# Patient Record
Sex: FEMALE | Race: ASIAN | NOT HISPANIC OR LATINO | URBAN - METROPOLITAN AREA
[De-identification: names, ages, dates, MRNs, and addresses within clinical notes are randomized per-mention and may not be internally consistent; named-entity substitution may affect disease eponyms.]

---

## 2019-11-18 ENCOUNTER — OUTPATIENT (OUTPATIENT)
Dept: OUTPATIENT SERVICES | Facility: HOSPITAL | Age: 39
LOS: 1 days | End: 2019-11-18
Payer: COMMERCIAL

## 2019-11-18 ENCOUNTER — APPOINTMENT (OUTPATIENT)
Dept: RADIOLOGY | Facility: CLINIC | Age: 39
End: 2019-11-18
Payer: COMMERCIAL

## 2019-11-18 DIAGNOSIS — Z00.8 ENCOUNTER FOR OTHER GENERAL EXAMINATION: ICD-10-CM

## 2019-11-18 PROCEDURE — 71046 X-RAY EXAM CHEST 2 VIEWS: CPT

## 2019-11-18 PROCEDURE — 71046 X-RAY EXAM CHEST 2 VIEWS: CPT | Mod: 26

## 2020-04-25 ENCOUNTER — MESSAGE (OUTPATIENT)
Age: 40
End: 2020-04-25

## 2020-05-05 LAB
SARS-COV-2 IGG SERPL IA-ACNC: 7.4 INDEX
SARS-COV-2 IGG SERPL QL IA: POSITIVE

## 2020-12-15 ENCOUNTER — RESULT REVIEW (OUTPATIENT)
Age: 40
End: 2020-12-15

## 2021-05-19 ENCOUNTER — APPOINTMENT (OUTPATIENT)
Dept: OBGYN | Facility: CLINIC | Age: 41
End: 2021-05-19
Payer: COMMERCIAL

## 2021-05-19 ENCOUNTER — NON-APPOINTMENT (OUTPATIENT)
Age: 41
End: 2021-05-19

## 2021-05-19 VITALS
SYSTOLIC BLOOD PRESSURE: 110 MMHG | BODY MASS INDEX: 25.72 KG/M2 | DIASTOLIC BLOOD PRESSURE: 70 MMHG | HEIGHT: 60 IN | WEIGHT: 131 LBS

## 2021-05-19 DIAGNOSIS — Z87.42 PERSONAL HISTORY OF OTHER DISEASES OF THE FEMALE GENITAL TRACT: ICD-10-CM

## 2021-05-19 DIAGNOSIS — Z97.5 PRESENCE OF (INTRAUTERINE) CONTRACEPTIVE DEVICE: ICD-10-CM

## 2021-05-19 DIAGNOSIS — Z11.3 ENCOUNTER FOR SCREENING FOR INFECTIONS WITH A PREDOMINANTLY SEXUAL MODE OF TRANSMISSION: ICD-10-CM

## 2021-05-19 DIAGNOSIS — N89.8 OTHER SPECIFIED NONINFLAMMATORY DISORDERS OF VAGINA: ICD-10-CM

## 2021-05-19 PROCEDURE — 82270 OCCULT BLOOD FECES: CPT

## 2021-05-19 PROCEDURE — 99386 PREV VISIT NEW AGE 40-64: CPT

## 2021-05-19 PROCEDURE — 99072 ADDL SUPL MATRL&STAF TM PHE: CPT

## 2021-05-19 NOTE — HISTORY OF PRESENT ILLNESS
[FreeTextEntry1] : Patient is a 40-year-old female presents for routine annual gynecologic examination. Patient has complaints of occasional intermittent vaginal itching. Patient denies any abnormal vaginal discharge or odor. Patient desires STD screening and would like IUD contraception. Discussed IUDs patient would like a ParaGard 10 year IUD. Risks benefits and alternatives discussed the patient and instructions given. Patient has had a mammogram several years ago.

## 2021-05-19 NOTE — PHYSICAL EXAM
[Appropriately responsive] : appropriately responsive [Alert] : alert [No Acute Distress] : no acute distress [No Lymphadenopathy] : no lymphadenopathy [Regular Rate Rhythm] : regular rate rhythm [No Murmurs] : no murmurs [Clear to Auscultation B/L] : clear to auscultation bilaterally [Soft] : soft [Non-tender] : non-tender [Non-distended] : non-distended [No HSM] : No HSM [No Lesions] : no lesions [No Mass] : no mass [Oriented x3] : oriented x3 [FreeTextEntry6] : bilateral breast implants, soft nontender without any masses, no skin changes or nipple discharge, axilla negative [Examination Of The Breasts] : a normal appearance [No Masses] : no breast masses were palpable [Labia Minora] : normal [Labia Majora] : normal [Normal] : normal [Uterine Adnexae] : normal [No Tenderness] : no tenderness [Nl Sphincter Tone] : normal sphincter tone [FreeTextEntry4] : culture done [FreeTextEntry5] : pap done [FreeTextEntry9] : Hemoccult-negative

## 2021-05-20 ENCOUNTER — NON-APPOINTMENT (OUTPATIENT)
Age: 41
End: 2021-05-20

## 2021-05-20 LAB
CANDIDA VAG CYTO: NOT DETECTED
G VAGINALIS+PREV SP MTYP VAG QL MICRO: NOT DETECTED
T VAGINALIS VAG QL WET PREP: NOT DETECTED

## 2021-05-21 LAB
C TRACH RRNA SPEC QL NAA+PROBE: NOT DETECTED
HPV HIGH+LOW RISK DNA PNL CVX: NOT DETECTED
N GONORRHOEA RRNA SPEC QL NAA+PROBE: NOT DETECTED
SOURCE TP AMPLIFICATION: NORMAL

## 2021-06-14 ENCOUNTER — APPOINTMENT (OUTPATIENT)
Dept: OBGYN | Facility: CLINIC | Age: 41
End: 2021-06-14

## 2021-08-27 ENCOUNTER — APPOINTMENT (OUTPATIENT)
Dept: DISASTER EMERGENCY | Facility: OTHER | Age: 41
End: 2021-08-27

## 2021-10-02 ENCOUNTER — APPOINTMENT (OUTPATIENT)
Dept: DISASTER EMERGENCY | Facility: OTHER | Age: 41
End: 2021-10-02

## 2021-12-28 ENCOUNTER — APPOINTMENT (OUTPATIENT)
Dept: PSYCHIATRY | Facility: CLINIC | Age: 41
End: 2021-12-28

## 2022-04-08 ENCOUNTER — TRANSCRIPTION ENCOUNTER (OUTPATIENT)
Age: 42
End: 2022-04-08

## 2022-04-26 ENCOUNTER — NON-APPOINTMENT (OUTPATIENT)
Age: 42
End: 2022-04-26

## 2022-04-26 ENCOUNTER — APPOINTMENT (OUTPATIENT)
Dept: INTERNAL MEDICINE | Facility: CLINIC | Age: 42
End: 2022-04-26
Payer: COMMERCIAL

## 2022-04-26 VITALS
SYSTOLIC BLOOD PRESSURE: 156 MMHG | HEIGHT: 60 IN | HEART RATE: 95 BPM | BODY MASS INDEX: 25.23 KG/M2 | TEMPERATURE: 98 F | RESPIRATION RATE: 12 BRPM | OXYGEN SATURATION: 97 % | DIASTOLIC BLOOD PRESSURE: 98 MMHG | WEIGHT: 128.5 LBS

## 2022-04-26 PROCEDURE — 99204 OFFICE O/P NEW MOD 45 MIN: CPT

## 2022-04-26 NOTE — REVIEW OF SYSTEMS
[Fever] : no fever [Chills] : no chills [Fatigue] : no fatigue [Night Sweats] : no night sweats [Chest Pain] : no chest pain [Palpitations] : no palpitations [Shortness Of Breath] : no shortness of breath [Wheezing] : no wheezing [Cough] : no cough [Abdominal Pain] : no abdominal pain [Nausea] : no nausea [Constipation] : no constipation [Diarrhea] : diarrhea [Vomiting] : no vomiting [Dysuria] : no dysuria [Hematuria] : no hematuria [Joint Pain] : no joint pain [Back Pain] : back pain [Headache] : no headache [Dizziness] : no dizziness

## 2022-04-26 NOTE — PHYSICAL EXAM
[No Acute Distress] : no acute distress [Well Nourished] : well nourished [Well Developed] : well developed [Well-Appearing] : well-appearing [Normal Sclera/Conjunctiva] : normal sclera/conjunctiva [No Respiratory Distress] : no respiratory distress  [No Accessory Muscle Use] : no accessory muscle use [Clear to Auscultation] : lungs were clear to auscultation bilaterally [Normal Rate] : normal rate  [Regular Rhythm] : with a regular rhythm [Normal S1, S2] : normal S1 and S2 [No Murmur] : no murmur heard [No Edema] : there was no peripheral edema [Soft] : abdomen soft [Non Tender] : non-tender [Non-distended] : non-distended [Normal Bowel Sounds] : normal bowel sounds [No CVA Tenderness] : no CVA  tenderness [No Spinal Tenderness] : no spinal tenderness [de-identified] : Positive straight leg test on the left [de-identified] : 2+ patellar reflexes bilaterally.  Normal strength bilaterally.  Sensation intact bilaterally

## 2022-04-26 NOTE — ASSESSMENT
[FreeTextEntry1] : 1.  Acute sciatica\par Stop cyclobenzaprine\par Start gabapentin 100 mg nightly and can increase to 100 mg twice daily\par Lumbar spine x-rays\par Ortho fast-track for expedited orthopedic evaluation

## 2022-04-26 NOTE — HISTORY OF PRESENT ILLNESS
[FreeTextEntry8] : Petr presents as a new patient.  She is concerned about sciatica and back pain.  She has been out of work since April 6.  Pain started around that time.  She thinks it may have possibly been triggered from a workout.  Denies any acute episodes where she suddenly felt sharp back pain however.  Reports that in the past, she has had mild low back pain that improved with exercising, stretching and going to the chiropractor.  However, she has never had back pain like this.  Pain started in the lower and left-sided lower back.  The pain radiated from the left back down the left [place] towards the left toe.  She did have numbness and tingling and some weakness on the left foot.  She went to urgent care.  Urgent care diagnosed her with sciatica and gave her a Medrol Dosepak and cyclobenzaprine.  She had some mild improvement with the Medrol Dosepak.  The cyclobenzaprine did not help her symptoms.  She has also been taking 2 Aleve a day.  She recently saw an acupuncturist and chiropractor.  She has had some minimal improvement since that time.  She has not yet seen an orthopedist.  No fevers or chills.  No unexplained weight loss.  No bowel or bladder incontinence.  No saddle anesthesia.

## 2022-04-27 ENCOUNTER — OUTPATIENT (OUTPATIENT)
Dept: OUTPATIENT SERVICES | Facility: HOSPITAL | Age: 42
LOS: 1 days | End: 2022-04-27
Payer: COMMERCIAL

## 2022-04-27 ENCOUNTER — RESULT REVIEW (OUTPATIENT)
Age: 42
End: 2022-04-27

## 2022-04-27 ENCOUNTER — APPOINTMENT (OUTPATIENT)
Dept: RADIOLOGY | Facility: CLINIC | Age: 42
End: 2022-04-27
Payer: COMMERCIAL

## 2022-04-27 DIAGNOSIS — M54.30 SCIATICA, UNSPECIFIED SIDE: ICD-10-CM

## 2022-04-27 PROCEDURE — 72100 X-RAY EXAM L-S SPINE 2/3 VWS: CPT | Mod: 26

## 2022-04-27 PROCEDURE — 72100 X-RAY EXAM L-S SPINE 2/3 VWS: CPT

## 2022-04-29 ENCOUNTER — APPOINTMENT (OUTPATIENT)
Dept: ORTHOPEDIC SURGERY | Facility: CLINIC | Age: 42
End: 2022-04-29
Payer: COMMERCIAL

## 2022-04-29 VITALS — WEIGHT: 128 LBS | HEIGHT: 60 IN | BODY MASS INDEX: 25.13 KG/M2

## 2022-04-29 PROCEDURE — 99204 OFFICE O/P NEW MOD 45 MIN: CPT

## 2022-04-29 NOTE — HISTORY OF PRESENT ILLNESS
[de-identified] : 42 yo female with 3 weeks of low back pain with right sided radiculopathy down the buttock, posterior thigh and lateral thigh. She went to the urgent care initially, started on prednisone, Gabapentin and an NSAID. She has been seeing a chiropractor and doing acupuncture. She states the pain has improved since it began 3 weeks ago. She denies any previous history of back pain. Denies any bowel or bladder dysfunction or saddle anesthesia.

## 2022-04-29 NOTE — PHYSICAL EXAM
[de-identified] : Lumbar Physical Exam\par \par Gait - Normal\par \par Station - Normal\par \par Sagittal balance - Normal\par \par Compensatory mechanism? - None\par \par Difficulty with heel and toe walk\par \par Reflexes\par Patellar - normal\par Gastroc - normal\par Clonus - No\par \par Hip Exam - Normal\par \par Straight leg raise - none\par \par Pulses - 2+ dp/pt\par \par Range of motion - normal\par \par Sensation \par Sensation intact to light touch in L1, L2, L3, L4, L5 and S1 dermatomes bilaterally\par \par Motor\par 	IP	Quad	HS	TA	Gastroc	EHL\par Right	5/5	5/5	5/5	5/5	5/5	5/5\par Left	5/5	5/5	5/5	5/5	4/5	5/5 [de-identified] : X-rays reviewed\par Disc degeneration noted at L5-S1

## 2022-04-29 NOTE — ASSESSMENT
[FreeTextEntry1] : I had a lengthy discussion with the patient in regards to their treatment plan and diagnosis.  They do have objective weakness findings on my exam.  Their symptoms have persisted despite the conservative management they have attempted thus far.  As a result I would like to proceed with a lumbar MRI.  In tandem with this they should begin physical therapy/home therapy program.  The patient can take Tylenol/NSAIDs as needed for pain control if medically able to.  I will have the patient follow-up in 3 to 4 weeks for repeat clinical evaluation.  I encouraged them to follow-up sooner if their symptoms worsen or change in any way.\par \par The patient has tried the following treatments:\par Activity modification          +\par Ice/Compression                  +\par Braces                                     -\par NSAIDS                                   +\par Physical Therapy                   +\par \par Please note the above modalities been tried for over 6+ weeks of the past 2 months

## 2022-05-03 ENCOUNTER — OUTPATIENT (OUTPATIENT)
Dept: OUTPATIENT SERVICES | Facility: HOSPITAL | Age: 42
LOS: 1 days | End: 2022-05-03
Payer: COMMERCIAL

## 2022-05-03 ENCOUNTER — APPOINTMENT (OUTPATIENT)
Dept: MRI IMAGING | Facility: CLINIC | Age: 42
End: 2022-05-03
Payer: COMMERCIAL

## 2022-05-03 DIAGNOSIS — M54.9 DORSALGIA, UNSPECIFIED: ICD-10-CM

## 2022-05-03 PROCEDURE — 72148 MRI LUMBAR SPINE W/O DYE: CPT

## 2022-05-03 PROCEDURE — 72148 MRI LUMBAR SPINE W/O DYE: CPT | Mod: 26

## 2022-05-17 ENCOUNTER — APPOINTMENT (OUTPATIENT)
Dept: PSYCHIATRY | Facility: CLINIC | Age: 42
End: 2022-05-17
Payer: COMMERCIAL

## 2022-05-17 VITALS
OXYGEN SATURATION: 99 % | BODY MASS INDEX: 25.13 KG/M2 | HEART RATE: 83 BPM | WEIGHT: 128 LBS | RESPIRATION RATE: 13 BRPM | HEIGHT: 60 IN

## 2022-05-17 PROCEDURE — 90791 PSYCH DIAGNOSTIC EVALUATION: CPT

## 2022-05-19 ENCOUNTER — APPOINTMENT (OUTPATIENT)
Dept: ORTHOPEDIC SURGERY | Facility: CLINIC | Age: 42
End: 2022-05-19
Payer: COMMERCIAL

## 2022-05-19 VITALS
HEIGHT: 60 IN | BODY MASS INDEX: 25.13 KG/M2 | SYSTOLIC BLOOD PRESSURE: 115 MMHG | WEIGHT: 128 LBS | DIASTOLIC BLOOD PRESSURE: 76 MMHG | RESPIRATION RATE: 78 BRPM | OXYGEN SATURATION: 96 %

## 2022-05-19 PROCEDURE — 99214 OFFICE O/P EST MOD 30 MIN: CPT

## 2022-05-19 NOTE — HISTORY OF PRESENT ILLNESS
[de-identified] : Today the patient states that she is still dealing with some significant left-sided leg pain.  It does go down her posterior thigh posterior calf.  She does state that the symptoms are disabling at times.  Fortunately since her last visit she has had some improvement in her pain but the discomfort is still present.  She denies any bowel bladder issues.  She denies any saddle anesthesia.

## 2022-05-19 NOTE — PHYSICAL EXAM
[de-identified] : Lumbar Physical Exam\par \par Gait - Normal\par \par Station - Normal\par \par Sagittal balance - Normal\par \par Compensatory mechanism? - None\par \par Difficulty with heel and toe walk\par \par Reflexes\par Patellar - normal\par Gastroc - normal\par Clonus - No\par \par Hip Exam - Normal\par \par Straight leg raise - none\par \par Pulses - 2+ dp/pt\par \par Range of motion - normal\par \par Sensation \par Sensation intact to light touch in L1, L2, L3, L4, L5 and S1 dermatomes bilaterally\par \par Motor\par 	IP	Quad	HS	TA	Gastroc	EHL\par Right	5/5	5/5	5/5	5/5	5/5	5/5\par Left	5/5	5/5	5/5	5/5	4/5	5/5 [de-identified] : X-rays reviewed\par Disc degeneration noted at L5-S1\par \par Lumbar MRI reviewed\par Large left-sided L5-S1 disc herniation\par Impingement on the descending S1 nerve root

## 2022-05-19 NOTE — ASSESSMENT
[FreeTextEntry1] : I had a long discussion with the patient regards to her treatment plan and diagnosis.  We discussed pros and cons of both operative and nonoperative modalities of care given the fact that she has a left-sided lumbosacral radiculopathy in the setting of an L5-S1 disc herniation.  We discussed surgery, epidural steroid injection, physical therapy.  The patient would like to think through her options.  I did give her my direct contact information and encouraged her to reach out to me at any point if she has any questions or concerns in regards to her care.  All questions were answered.

## 2022-06-13 ENCOUNTER — APPOINTMENT (OUTPATIENT)
Dept: PSYCHIATRY | Facility: CLINIC | Age: 42
End: 2022-06-13
Payer: COMMERCIAL

## 2022-06-13 ENCOUNTER — APPOINTMENT (OUTPATIENT)
Dept: OBGYN | Facility: CLINIC | Age: 42
End: 2022-06-13
Payer: COMMERCIAL

## 2022-06-13 VITALS
HEIGHT: 60 IN | WEIGHT: 130 LBS | DIASTOLIC BLOOD PRESSURE: 72 MMHG | BODY MASS INDEX: 25.52 KG/M2 | SYSTOLIC BLOOD PRESSURE: 110 MMHG

## 2022-06-13 DIAGNOSIS — Z01.419 ENCOUNTER FOR GYNECOLOGICAL EXAMINATION (GENERAL) (ROUTINE) W/OUT ABNORMAL FINDINGS: ICD-10-CM

## 2022-06-13 DIAGNOSIS — Z30.09 ENCOUNTER FOR OTHER GENERAL COUNSELING AND ADVICE ON CONTRACEPTION: ICD-10-CM

## 2022-06-13 PROCEDURE — 82270 OCCULT BLOOD FECES: CPT

## 2022-06-13 PROCEDURE — 99213 OFFICE O/P EST LOW 20 MIN: CPT

## 2022-06-13 PROCEDURE — 99396 PREV VISIT EST AGE 40-64: CPT

## 2022-06-13 NOTE — HISTORY OF PRESENT ILLNESS
[FreeTextEntry1] : Patient is a 41-year-old female who presents for routine annual gynecologic examination.  Patient has no complaints.  Patient states she would like a ParaGard IUD.  Patient counseled risk benefits and alternatives reviewed patient to schedule.  Patient's last mammogram was 2017.

## 2022-06-13 NOTE — PHYSICAL EXAM
[Appropriately responsive] : appropriately responsive [Alert] : alert [No Acute Distress] : no acute distress [No Lymphadenopathy] : no lymphadenopathy [Regular Rate Rhythm] : regular rate rhythm [No Murmurs] : no murmurs [Clear to Auscultation B/L] : clear to auscultation bilaterally [Soft] : soft [Non-tender] : non-tender [Non-distended] : non-distended [No HSM] : No HSM [No Lesions] : no lesions [No Mass] : no mass [Oriented x3] : oriented x3 [Examination Of The Breasts] : a normal appearance [No Masses] : no breast masses were palpable [Labia Majora] : normal [Labia Minora] : normal [Normal] : normal [Retroversion] : retroverted [Uterine Adnexae] : normal [No Tenderness] : no tenderness [Nl Sphincter Tone] : normal sphincter tone [FreeTextEntry5] : Pap done [FreeTextEntry6] : Pap [FreeTextEntry9] : Hemoccult negative

## 2022-06-15 LAB
C TRACH RRNA SPEC QL NAA+PROBE: NOT DETECTED
CYTOLOGY CVX/VAG DOC THIN PREP: NORMAL
HPV HIGH+LOW RISK DNA PNL CVX: NOT DETECTED
N GONORRHOEA RRNA SPEC QL NAA+PROBE: NOT DETECTED
SOURCE TP AMPLIFICATION: NORMAL

## 2022-06-22 ENCOUNTER — OUTPATIENT (OUTPATIENT)
Dept: OUTPATIENT SERVICES | Facility: HOSPITAL | Age: 42
LOS: 1 days | End: 2022-06-22
Payer: COMMERCIAL

## 2022-06-22 ENCOUNTER — APPOINTMENT (OUTPATIENT)
Dept: RADIOLOGY | Facility: CLINIC | Age: 42
End: 2022-06-22
Payer: COMMERCIAL

## 2022-06-22 ENCOUNTER — RESULT REVIEW (OUTPATIENT)
Age: 42
End: 2022-06-22

## 2022-06-22 DIAGNOSIS — M54.9 DORSALGIA, UNSPECIFIED: ICD-10-CM

## 2022-06-22 PROCEDURE — 62323 NJX INTERLAMINAR LMBR/SAC: CPT

## 2022-06-30 ENCOUNTER — APPOINTMENT (OUTPATIENT)
Dept: INTERNAL MEDICINE | Facility: CLINIC | Age: 42
End: 2022-06-30

## 2022-06-30 VITALS
SYSTOLIC BLOOD PRESSURE: 135 MMHG | BODY MASS INDEX: 25.52 KG/M2 | DIASTOLIC BLOOD PRESSURE: 92 MMHG | HEIGHT: 60 IN | HEART RATE: 75 BPM | OXYGEN SATURATION: 96 % | WEIGHT: 130 LBS

## 2022-06-30 DIAGNOSIS — M54.9 DORSALGIA, UNSPECIFIED: ICD-10-CM

## 2022-06-30 DIAGNOSIS — E01.0 IODINE-DEFICIENCY RELATED DIFFUSE (ENDEMIC) GOITER: ICD-10-CM

## 2022-06-30 PROCEDURE — G0444 DEPRESSION SCREEN ANNUAL: CPT | Mod: 59

## 2022-06-30 PROCEDURE — 36415 COLL VENOUS BLD VENIPUNCTURE: CPT

## 2022-06-30 PROCEDURE — 93000 ELECTROCARDIOGRAM COMPLETE: CPT | Mod: 59

## 2022-06-30 PROCEDURE — 99213 OFFICE O/P EST LOW 20 MIN: CPT | Mod: 25

## 2022-06-30 NOTE — HISTORY OF PRESENT ILLNESS
[FreeTextEntry1] : CPE, back pain [de-identified] : Petr presents for CPE.  She was last seen for acute low back pain.  She had Ortho evaluation and ultimately underwent epidural injection to the lumbar spine.  She also continues to see the chiropractor, have acupuncture, and do physical therapy.  She reports that the pain is better.  She feels ready to return to work.  She does not think she will need any restrictions going back to work.  She otherwise has no acute concerns.

## 2022-06-30 NOTE — HEALTH RISK ASSESSMENT
[Never] : Never [Yes] : Yes [0] : 2) Feeling down, depressed, or hopeless: Not at all (0) [PHQ-2 Negative - No further assessment needed] : PHQ-2 Negative - No further assessment needed [[id number]] : 0 [MammogramComments] : Rx provided [PapSmearComments] : Through GYN [ColonoscopyComments] : Had done last year with Dr. Yi, will get records

## 2022-06-30 NOTE — PHYSICAL EXAM
[No Acute Distress] : no acute distress [Well Nourished] : well nourished [Well Developed] : well developed [Well-Appearing] : well-appearing [Normal Sclera/Conjunctiva] : normal sclera/conjunctiva [No Lymphadenopathy] : no lymphadenopathy [No Respiratory Distress] : no respiratory distress  [No Accessory Muscle Use] : no accessory muscle use [Clear to Auscultation] : lungs were clear to auscultation bilaterally [Normal Rate] : normal rate  [Regular Rhythm] : with a regular rhythm [Normal S1, S2] : normal S1 and S2 [No Murmur] : no murmur heard [No Abdominal Bruit] : a ~M bruit was not heard ~T in the abdomen [No Edema] : there was no peripheral edema [No Palpable Aorta] : no palpable aorta [Declined Breast Exam] : declined breast exam  [No Axillary Lymphadenopathy] : no axillary lymphadenopathy [Soft] : abdomen soft [Non Tender] : non-tender [Non-distended] : non-distended [Normal Bowel Sounds] : normal bowel sounds [Normal Supraclavicular Nodes] : no supraclavicular lymphadenopathy [Normal Axillary Nodes] : no axillary lymphadenopathy [Normal Posterior Cervical Nodes] : no posterior cervical lymphadenopathy [Normal Anterior Cervical Nodes] : no anterior cervical lymphadenopathy [Normal Gait] : normal gait [Alert and Oriented x3] : oriented to person, place, and time [de-identified] : ?thyromegaly [de-identified] : Just done by GYN

## 2022-06-30 NOTE — REVIEW OF SYSTEMS
[Fever] : no fever [Chills] : no chills [Fatigue] : no fatigue [Night Sweats] : no night sweats [Chest Pain] : no chest pain [Palpitations] : no palpitations [Shortness Of Breath] : no shortness of breath [Wheezing] : no wheezing [Cough] : no cough [Abdominal Pain] : no abdominal pain [Nausea] : no nausea [Constipation] : no constipation [Diarrhea] : diarrhea [Vomiting] : no vomiting [Dysuria] : no dysuria [Hematuria] : no hematuria [Joint Pain] : no joint pain [Muscle Pain] : no muscle pain [Back Pain] : back pain [Headache] : no headache [Dizziness] : no dizziness

## 2022-06-30 NOTE — ASSESSMENT
[FreeTextEntry1] : 1. CPE\par CBC, CMP, Lipids, A1c, UA\par EKG is SR, PVCs present\par Rx for mammo\par UTD with PAP, colo (will get colo records)\par \par 2. ?Thyromegaly\par TFTs\par Thyroid sono\par \par 3. Back pain\par Still doing PT, chiropractor, acupuncture\par s/p injection\par Feels ready to return to work so letter provided

## 2022-07-09 LAB
ALBUMIN SERPL ELPH-MCNC: 4.8 G/DL
ALP BLD-CCNC: 56 U/L
ALT SERPL-CCNC: 12 U/L
ANION GAP SERPL CALC-SCNC: 12 MMOL/L
AST SERPL-CCNC: 15 U/L
BASOPHILS # BLD AUTO: 0.04 K/UL
BASOPHILS NFR BLD AUTO: 0.5 %
BILIRUB SERPL-MCNC: 0.6 MG/DL
BUN SERPL-MCNC: 21 MG/DL
CALCIUM SERPL-MCNC: 9.7 MG/DL
CHLORIDE SERPL-SCNC: 103 MMOL/L
CHOLEST SERPL-MCNC: 186 MG/DL
CO2 SERPL-SCNC: 24 MMOL/L
CREAT SERPL-MCNC: 0.94 MG/DL
EGFR: 77 ML/MIN/1.73M2
EOSINOPHIL # BLD AUTO: 0.12 K/UL
EOSINOPHIL NFR BLD AUTO: 1.6 %
ESTIMATED AVERAGE GLUCOSE: 100 MG/DL
GLUCOSE SERPL-MCNC: 98 MG/DL
HBA1C MFR BLD HPLC: 5.1 %
HCT VFR BLD CALC: 40.3 %
HDLC SERPL-MCNC: 75 MG/DL
HGB BLD-MCNC: 13.4 G/DL
IMM GRANULOCYTES NFR BLD AUTO: 0.4 %
LDLC SERPL CALC-MCNC: 94 MG/DL
LYMPHOCYTES # BLD AUTO: 1.9 K/UL
LYMPHOCYTES NFR BLD AUTO: 24.6 %
MAN DIFF?: NORMAL
MCHC RBC-ENTMCNC: 32.1 PG
MCHC RBC-ENTMCNC: 33.3 GM/DL
MCV RBC AUTO: 96.4 FL
MONOCYTES # BLD AUTO: 0.48 K/UL
MONOCYTES NFR BLD AUTO: 6.2 %
NEUTROPHILS # BLD AUTO: 5.15 K/UL
NEUTROPHILS NFR BLD AUTO: 66.7 %
NONHDLC SERPL-MCNC: 111 MG/DL
PLATELET # BLD AUTO: 309 K/UL
POTASSIUM SERPL-SCNC: 4.3 MMOL/L
PROT SERPL-MCNC: 7.5 G/DL
RBC # BLD: 4.18 M/UL
RBC # FLD: 12.7 %
SODIUM SERPL-SCNC: 139 MMOL/L
TRIGL SERPL-MCNC: 84 MG/DL
TSH SERPL-ACNC: 0.83 UIU/ML
WBC # FLD AUTO: 7.72 K/UL

## 2022-07-11 ENCOUNTER — APPOINTMENT (OUTPATIENT)
Dept: OBGYN | Facility: CLINIC | Age: 42
End: 2022-07-11

## 2022-07-11 DIAGNOSIS — Z30.430 ENCOUNTER FOR INSERTION OF INTRAUTERINE CONTRACEPTIVE DEVICE: ICD-10-CM

## 2022-07-11 PROCEDURE — 58300 INSERT INTRAUTERINE DEVICE: CPT

## 2022-07-11 PROCEDURE — 64435 NJX AA&/STRD PARACRV NRV: CPT

## 2022-07-11 NOTE — PROCEDURE
[IUD Placement] : intrauterine device (IUD) placement [Time out performed] : Pre-procedure time out performed.  Patient's name, date of birth and procedure confirmed. [Consent Obtained] : Consent obtained [Prevention of Pregnancy] : prevention of pregnancy [Risks] : risks [Benefits] : benefits [Alternatives] : alternatives [Patient] : patient [Infection] : infection [Bleeding] : bleeding [Pain] : pain [Expulsion] : expulsion [Failure] : failure [Uterine Perforation] : uterine perforation [Neg Pregnancy Test] : negative pregnancy test [Cytotec] : Cytotec [Betadine] : Betadine [Tenaculum] : Tenaculum [Required Dilation] : required dilation [Sounded to ___ cm] : sounded to [unfilled] ~Ucm [ParaGard IUD] : ParaGard IUD [Tolerated Well] : Patient tolerated the procedure well [No Complications] : No complications [Motrin/Ibuprofen] : Motrin/Ibuprofen [de-identified] : 7/32

## 2022-08-10 ENCOUNTER — APPOINTMENT (OUTPATIENT)
Dept: OBGYN | Facility: CLINIC | Age: 42
End: 2022-08-10

## 2022-08-10 VITALS
HEIGHT: 60 IN | DIASTOLIC BLOOD PRESSURE: 70 MMHG | BODY MASS INDEX: 25.52 KG/M2 | WEIGHT: 130 LBS | SYSTOLIC BLOOD PRESSURE: 108 MMHG

## 2022-08-10 DIAGNOSIS — Z97.5 PRESENCE OF (INTRAUTERINE) CONTRACEPTIVE DEVICE: ICD-10-CM

## 2022-08-10 PROCEDURE — 99213 OFFICE O/P EST LOW 20 MIN: CPT | Mod: 25

## 2022-08-10 PROCEDURE — 76830 TRANSVAGINAL US NON-OB: CPT

## 2022-08-10 NOTE — PROCEDURE
[Locate IUD] : locate IUD [Transvaginal Ultrasound] : transvaginal ultrasound [FreeTextEntry3] : Transvaginal pelvic ultrasound performed: Uterus appears within normal limits.  An IUD is seen within the endometrial cavity with in proper position and placement otherwise within normal limits

## 2022-08-10 NOTE — HISTORY OF PRESENT ILLNESS
[FreeTextEntry1] : Patient is a 41-year-old female presents 1 month following insertion of a ParaGard IUD.  Patient has no complaints

## 2022-08-26 ENCOUNTER — RESULT REVIEW (OUTPATIENT)
Age: 42
End: 2022-08-26

## 2022-08-26 ENCOUNTER — APPOINTMENT (OUTPATIENT)
Dept: MAMMOGRAPHY | Facility: CLINIC | Age: 42
End: 2022-08-26

## 2022-08-26 ENCOUNTER — OUTPATIENT (OUTPATIENT)
Dept: OUTPATIENT SERVICES | Facility: HOSPITAL | Age: 42
LOS: 1 days | End: 2022-08-26
Payer: COMMERCIAL

## 2022-08-26 DIAGNOSIS — E01.0 IODINE-DEFICIENCY RELATED DIFFUSE (ENDEMIC) GOITER: ICD-10-CM

## 2022-08-26 DIAGNOSIS — Z00.00 ENCOUNTER FOR GENERAL ADULT MEDICAL EXAMINATION WITHOUT ABNORMAL FINDINGS: ICD-10-CM

## 2022-08-26 PROCEDURE — 77067 SCR MAMMO BI INCL CAD: CPT

## 2022-08-26 PROCEDURE — 77063 BREAST TOMOSYNTHESIS BI: CPT

## 2022-08-26 PROCEDURE — 77067 SCR MAMMO BI INCL CAD: CPT | Mod: 26

## 2022-08-26 PROCEDURE — 77063 BREAST TOMOSYNTHESIS BI: CPT | Mod: 26

## 2022-09-12 ENCOUNTER — APPOINTMENT (OUTPATIENT)
Dept: PSYCHIATRY | Facility: CLINIC | Age: 42
End: 2022-09-12

## 2022-09-12 PROCEDURE — 99213 OFFICE O/P EST LOW 20 MIN: CPT

## 2022-11-28 ENCOUNTER — RX RENEWAL (OUTPATIENT)
Age: 42
End: 2022-11-28

## 2023-02-19 ENCOUNTER — EMERGENCY (EMERGENCY)
Facility: HOSPITAL | Age: 43
LOS: 1 days | Discharge: HOME | End: 2023-02-19
Attending: EMERGENCY MEDICINE | Admitting: EMERGENCY MEDICINE
Payer: COMMERCIAL

## 2023-02-19 VITALS
SYSTOLIC BLOOD PRESSURE: 109 MMHG | OXYGEN SATURATION: 98 % | DIASTOLIC BLOOD PRESSURE: 68 MMHG | RESPIRATION RATE: 15 BRPM | TEMPERATURE: 98 F | WEIGHT: 134.92 LBS | HEART RATE: 85 BPM

## 2023-02-19 LAB
ALBUMIN SERPL ELPH-MCNC: 4.2 G/DL — SIGNIFICANT CHANGE UP (ref 3.3–5)
ALP SERPL-CCNC: 73 U/L — SIGNIFICANT CHANGE UP (ref 40–120)
ALT FLD-CCNC: 21 U/L — SIGNIFICANT CHANGE UP (ref 10–45)
AST SERPL-CCNC: 22 U/L — SIGNIFICANT CHANGE UP (ref 10–40)
BILIRUB DIRECT SERPL-MCNC: 0.1 MG/DL — SIGNIFICANT CHANGE UP (ref 0–0.3)
BILIRUB INDIRECT FLD-MCNC: 0.1 MG/DL — LOW (ref 0.2–1)
BILIRUB SERPL-MCNC: 0.2 MG/DL — SIGNIFICANT CHANGE UP (ref 0.2–1.2)
BUN SERPL-MCNC: 18 MG/DL — SIGNIFICANT CHANGE UP (ref 7–23)
CREAT SERPL-MCNC: 1.01 MG/DL — SIGNIFICANT CHANGE UP (ref 0.5–1.3)
EGFR: 70 ML/MIN/1.73M2 — SIGNIFICANT CHANGE UP
HCT VFR BLD CALC: 40.8 % — SIGNIFICANT CHANGE UP (ref 34.5–45)
HCV AB S/CO SERPL IA: 0.09 S/CO — SIGNIFICANT CHANGE UP (ref 0–0.99)
HCV AB SERPL-IMP: SIGNIFICANT CHANGE UP
HGB BLD-MCNC: 13.5 G/DL — SIGNIFICANT CHANGE UP (ref 11.5–15.5)
HIV 1+2 AB+HIV1 P24 AG SERPL QL IA: SIGNIFICANT CHANGE UP
MCHC RBC-ENTMCNC: 30.5 PG — SIGNIFICANT CHANGE UP (ref 27–34)
MCHC RBC-ENTMCNC: 33.1 GM/DL — SIGNIFICANT CHANGE UP (ref 32–36)
MCV RBC AUTO: 92.3 FL — SIGNIFICANT CHANGE UP (ref 80–100)
NRBC # BLD: 0 /100 WBCS — SIGNIFICANT CHANGE UP (ref 0–0)
PLATELET # BLD AUTO: 325 K/UL — SIGNIFICANT CHANGE UP (ref 150–400)
PROT SERPL-MCNC: 8.3 G/DL — SIGNIFICANT CHANGE UP (ref 6–8.3)
RBC # BLD: 4.42 M/UL — SIGNIFICANT CHANGE UP (ref 3.8–5.2)
RBC # FLD: 12 % — SIGNIFICANT CHANGE UP (ref 10.3–14.5)
WBC # BLD: 5.89 K/UL — SIGNIFICANT CHANGE UP (ref 3.8–10.5)
WBC # FLD AUTO: 5.89 K/UL — SIGNIFICANT CHANGE UP (ref 3.8–10.5)

## 2023-02-19 PROCEDURE — 99283 EMERGENCY DEPT VISIT LOW MDM: CPT | Mod: 25

## 2023-02-19 PROCEDURE — 86707 HEPATITIS BE ANTIBODY: CPT

## 2023-02-19 PROCEDURE — 90715 TDAP VACCINE 7 YRS/> IM: CPT

## 2023-02-19 PROCEDURE — 90471 IMMUNIZATION ADMIN: CPT

## 2023-02-19 PROCEDURE — 86803 HEPATITIS C AB TEST: CPT

## 2023-02-19 PROCEDURE — 84520 ASSAY OF UREA NITROGEN: CPT

## 2023-02-19 PROCEDURE — 80076 HEPATIC FUNCTION PANEL: CPT

## 2023-02-19 PROCEDURE — 86706 HEP B SURFACE ANTIBODY: CPT

## 2023-02-19 PROCEDURE — 87522 HEPATITIS C REVRS TRNSCRPJ: CPT

## 2023-02-19 PROCEDURE — 36415 COLL VENOUS BLD VENIPUNCTURE: CPT

## 2023-02-19 PROCEDURE — 99284 EMERGENCY DEPT VISIT MOD MDM: CPT

## 2023-02-19 PROCEDURE — 87389 HIV-1 AG W/HIV-1&-2 AB AG IA: CPT

## 2023-02-19 PROCEDURE — 85027 COMPLETE CBC AUTOMATED: CPT

## 2023-02-19 PROCEDURE — 87350 HEPATITIS BE AG IA: CPT

## 2023-02-19 PROCEDURE — 82565 ASSAY OF CREATININE: CPT

## 2023-02-19 RX ORDER — TETANUS TOXOID, REDUCED DIPHTHERIA TOXOID AND ACELLULAR PERTUSSIS VACCINE, ADSORBED 5; 2.5; 8; 8; 2.5 [IU]/.5ML; [IU]/.5ML; UG/.5ML; UG/.5ML; UG/.5ML
0.5 SUSPENSION INTRAMUSCULAR ONCE
Refills: 0 | Status: COMPLETED | OUTPATIENT
Start: 2023-02-19 | End: 2023-02-19

## 2023-02-19 RX ADMIN — TETANUS TOXOID, REDUCED DIPHTHERIA TOXOID AND ACELLULAR PERTUSSIS VACCINE, ADSORBED 0.5 MILLILITER(S): 5; 2.5; 8; 8; 2.5 SUSPENSION INTRAMUSCULAR at 12:23

## 2023-02-19 NOTE — ED PROVIDER NOTE - OBJECTIVE STATEMENT
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 Pt reports she was performing ABG and poked herself with needle. Pt was wearing gloves and washed hands after incident. last tetanus for pt unknown. Pt supervisor aware, and consenting source pt for bloodwork. unknown hx of source pt. Pt denies any other symptoms.

## 2023-02-19 NOTE — ED PROVIDER NOTE - ATTENDING APP SHARED VISIT CONTRIBUTION OF CARE
Monika with [employer]. 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 Pt reports she was performing ABG and poked herself with needle. Pt was wearing gloves and washed hands after incident. last tetanus for pt unknown. Pt supervisor aware, and consenting source pt for bloodwork. unknown hx of source pt. Pt denies any other symptoms.  left 2nd finger- c/d/I, positive ROM, positive radial pulse, less than 2 sec cap refill   pt refused post exposure prophylaxis at this time. pt stable for dc and to follow up with employee health    I performed a face to face bedside interview with patient regarding history of present illness, review of symptoms and past medical history. I completed an independent physical exam.  I have discussed the patient's plan of care with Physician Assistant (PA). I agree with note as stated above, having amended the EMR as needed to reflect my findings.   This includes History of Present Illness, HIV, Past Medical/Surgical/Family/Social History, Allergies and Home Medications, Review of Systems, Physical Exam, and any Progress Notes during the time I functioned as the attending physician for this patient.

## 2023-02-19 NOTE — ED PROVIDER NOTE - PATIENT PORTAL LINK FT
You can access the FollowMyHealth Patient Portal offered by [facility] by registering at the following website: [email or web address]. By joining [facility]’s FollowMyHealth portal, you will also be able to view your health information using other applications (apps) compatible with our system.

## 2023-02-19 NOTE — ED PROVIDER NOTE - NSFOLLOWUPINSTRUCTIONS_ED_ALL_ED_FT
Follow up with employee health within 3 days    you were give tetanus in ED, and your arm may be sore   Take tylenol 650 mg every 6 hours as needed for pain   Clean area with soap and water daily   Return to the ED if any worsening or persistent symptoms

## 2023-02-19 NOTE — ED ADULT NURSE NOTE - NSIMPLEMENTINTERV_GEN_ALL_ED
Implemented All Universal Safety Interventions:  [place] to call system. Call bell, personal items and telephone within reach. Instruct patient to call for assistance. Room bathroom lighting operational. Non-slip footwear when patient is off stretcher. Physically safe environment: no spills, clutter or unnecessary equipment. Stretcher in lowest position, wheels locked, appropriate side rails in place.

## 2023-02-19 NOTE — ED PROVIDER NOTE - NS ED ATTENDING STATEMENT MOD
This was a shared visit with the [facility]. I reviewed and verified the documentation and independently performed the documented:

## 2023-02-19 NOTE — ED ADULT TRIAGE NOTE - NS ED TRIAGE AVPU SCALE
Called and updated spouse in car with room number. Yasmani Aragon Alert-The patient is alert, awake and responds to voice. The patient is oriented to time, place, and person. The triage nurse is able to obtain subjective information.

## 2023-02-19 NOTE — ED PROVIDER NOTE - CLINICAL SUMMARY MEDICAL DECISION MAKING FREE TEXT BOX
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 Pt reports she was performing ABG and poked herself with needle. Pt was wearing gloves and washed hands after incident. last tetanus for pt unknown. Pt supervisor aware, and consenting source pt for bloodwork. unknown hx of source pt. Pt denies any other symptoms.  left 2nd finger- c/d/I, positive ROM, positive radial pulse, less than 2 sec cap refill   pt refused PEEP at this time. pt stable for dc and to follow up with employee health 42 yr old female with no pmhx, employee presents to ED s/p needle stick to left 2nd finger. Pt reports she was performing ABG and poked herself with needle. Pt was wearing gloves and washed hands after incident. last tetanus for pt unknown. Pt supervisor aware, and consenting source pt for bloodwork. unknown hx of source pt. Pt denies any other symptoms.  left 2nd finger- c/d/I, positive ROM, positive radial pulse, less than 2 sec cap refill   pt refused PEP at this time. pt stable for dc and to follow up with employee health

## 2023-02-20 LAB
HBV E AB SER-ACNC: SIGNIFICANT CHANGE UP
HBV E AG SER-ACNC: SIGNIFICANT CHANGE UP

## 2023-02-21 LAB
HBV SURFACE AB SER-ACNC: SIGNIFICANT CHANGE UP
HCV RNA SPEC NAA+PROBE-LOG IU: SIGNIFICANT CHANGE UP
HCV RNA SPEC NAA+PROBE-LOG IU: SIGNIFICANT CHANGE UP LOGIU/ML

## 2023-03-07 ENCOUNTER — APPOINTMENT (OUTPATIENT)
Dept: PSYCHIATRY | Facility: CLINIC | Age: 43
End: 2023-03-07
Payer: COMMERCIAL

## 2023-03-07 DIAGNOSIS — F41.1 GENERALIZED ANXIETY DISORDER: ICD-10-CM

## 2023-03-07 PROCEDURE — 99213 OFFICE O/P EST LOW 20 MIN: CPT

## 2023-03-07 NOTE — PHYSICAL EXAM
[None] : none [Cooperative] : cooperative [Euthymic] : euthymic [Full] : full [Clear] : clear [Linear/Goal Directed] : linear/goal directed [Average] : average [WNL] : within normal limits [Anxious] : no anxious [Afraid] : not afraid [Appropriate] : not appropriate [Irritable] : no irritable [Dysphoric] : not dysphoric [Euphoric] : not euphoric [Incongruent to Content/Situation] : not incongruent to content/situation [Constricted] : not constricted [Labile] : not labile

## 2023-03-07 NOTE — SOCIAL HISTORY
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

## 2023-03-07 NOTE — RISK ASSESSMENT
[No, patient denies ideation or behavior] : No, patient denies ideation or behavior [Low acute suicide risk] : Low acute suicide risk [Not clinically indicated] : Safety Plan completed/updated (for individuals at risk): Not clinically indicated

## 2023-03-07 NOTE — PLAN
[Medication education provided] : Medication education provided. [Rationale for medication choices, possible risks/precautions, benefits, alternative treatment choices, and consequences of non-treatment discussed] : Rationale for medication choices, possible risks/precautions, benefits, alternative treatment choices, and consequences of non-treatment discussed with patient/family/caregiver  [FreeTextEntry5] : Plan:\par Continue Lexapro 10mg PO daily\par Continue Alprazolam PRN\par Continue outpatient CBT \par F/U in 4-6 months.

## 2023-03-07 NOTE — CURRENT PSYCHIATRIC SYMPTOMS
[Depressed Mood] : no depressed mood [Anhedonia] : no anhedonia [Guilt] : not feeling guilty [Decreased Concentration] : no decrease in concentrating ability [Hyperphagia] : no hyperphagia [Insomnia] : no insomnia disorder [Hypersomnia] : no ~T hypersomnia [Psychomotor Retardation] : no psychomotor retardation [Anorexia] : no anorexia [Excessive Worry] : no excessive worries [Ruminations] : no rumination disorder [Obsessions] : no obsessions [Re-experiencing] : no re-experiencing [Restlessness] : no restlessness [Hypochondriasis] : no hypochondriasis [Panic] : no panic disorder [Recent Onset] : no recent onset of confusion [Fluctuating Course] : no fluctuating course [Reversed sleep-wake] : no reversed sleep- wake [Inattentive] : not nattentive [Tremor] : ~T no ~M tremor was seen [Anxiety] : no anxiety [Hallucination Visual] : no visual hallucinations [Hallucination Tactile] : no tactile hallucinations [Hallucination Gustatory] : no gustatory hallucinations [de-identified] : denied manic sx [de-identified] : denied psychosis  [de-identified] : anxiety sx in remission  [de-identified] : denied sx [de-identified] : denied sx [de-identified] : denied sx

## 2023-03-07 NOTE — HISTORY OF PRESENT ILLNESS
[Not Applicable] : Not applicable [FreeTextEntry1] : Pt reports she is doing well on her medications, anxiety sx are in complete remission. She states life is going well, she is still working full time as a respiratory therapist. Pt states her family/children are "good.  She offers no complaints, appears well taken care of. Pt exercises regularly. No mood sx obtained or endorsed. Wants to try coming off meds, is fine with continuing treatment for at least one year since her mood has been stable- this will be in May 2023\par \par Sleep- WNL\par Appetite- WNL\par Energy- WNL\par Concentration- WNL\par \par Pt denied active or passive suicidal/homicidal ideations. No angela, hallucinations or psychosis in general elaborated on or obtained during the interview. PT benefits from this level of outpatient care to prevent deterioration of functioning. Side effects, benefits and risk discussed and pt agrees with the plan. \par \par Plan:\par Continue Lexapro 10mg PO daily\par Continue Alprazolam PRN\par Continue outpatient CBT  [FreeTextEntry2] : Saw a psychiatrist (cant remember the name) about two years ago for evaluation of anxiety and insomnia was prescribed Trazodone, slept better but felt groggy the next day.\par \par Medications: was prescribed Trazodone, slept better but felt groggy the next day. Tried Xanax 0.25mg PO daily before with good results\par \par Has never had a therapist \par \par No previous inpatient stays\par \par Previous psych dx: Depression, anxiety, insomnia.  [FreeTextEntry3] : Medications: was prescribed Trazodone, slept better but felt groggy the next day. Tried Xanax 0.25mg PO daily before with good results

## 2023-03-07 NOTE — PAST MEDICAL HISTORY
[FreeTextEntry1] : Saw a psychiatrist (cant remember the name) about two years ago for evaluation of anxiety and insomnia was prescribed Trazodone, slept better but felt groggy the next day.\par \par Medications: was prescribed Trazodone, slept better but felt groggy the next day. Tried Xanax 0.25mg PO daily before with good results\par \par Has never had a therapist \par \par No previous inpatient stays\par \par Previous psych dx: Depression, anxiety, insomnia

## 2023-04-19 ENCOUNTER — APPOINTMENT (OUTPATIENT)
Dept: OBGYN | Facility: CLINIC | Age: 43
End: 2023-04-19

## 2023-04-26 ENCOUNTER — APPOINTMENT (OUTPATIENT)
Dept: OBGYN | Facility: CLINIC | Age: 43
End: 2023-04-26
Payer: COMMERCIAL

## 2023-04-26 VITALS
WEIGHT: 138 LBS | DIASTOLIC BLOOD PRESSURE: 70 MMHG | BODY MASS INDEX: 27.09 KG/M2 | HEIGHT: 60 IN | SYSTOLIC BLOOD PRESSURE: 108 MMHG

## 2023-04-26 DIAGNOSIS — Z11.3 ENCOUNTER FOR SCREENING FOR INFECTIONS WITH A PREDOMINANTLY SEXUAL MODE OF TRANSMISSION: ICD-10-CM

## 2023-04-26 DIAGNOSIS — N89.8 OTHER SPECIFIED NONINFLAMMATORY DISORDERS OF VAGINA: ICD-10-CM

## 2023-04-26 PROCEDURE — 99214 OFFICE O/P EST MOD 30 MIN: CPT

## 2023-04-26 NOTE — PHYSICAL EXAM
[Normal] : uterus [Discharge] : a  ~M vaginal discharge was present [Moderate] : moderate [White] : white [Watery] : watery [No Bleeding] : there was no active vaginal bleeding [IUD String] : had an IUD string protruding out [Uterine Adnexae] : were not tender and not enlarged [FreeTextEntry4] : Cultures done

## 2023-04-26 NOTE — CHIEF COMPLAINT
[Urgent Visit] : Urgent Visit [FreeTextEntry1] : Patient is a 42-year-old female presents for an urgent visit with complaints of vaginal odor and discharge for several days.  Discussed with patient likely bacterial infection will prescribe metronidazole vaginal gel and further treatment pending culture results.  Patient request STD test

## 2023-04-27 LAB
C TRACH RRNA SPEC QL NAA+PROBE: NOT DETECTED
N GONORRHOEA RRNA SPEC QL NAA+PROBE: NOT DETECTED
SOURCE AMPLIFICATION: NORMAL

## 2023-05-01 ENCOUNTER — NON-APPOINTMENT (OUTPATIENT)
Age: 43
End: 2023-05-01

## 2023-05-01 LAB
CANDIDA VAG CYTO: NOT DETECTED
G VAGINALIS+PREV SP MTYP VAG QL MICRO: DETECTED
T VAGINALIS VAG QL WET PREP: NOT DETECTED

## 2023-05-22 ENCOUNTER — APPOINTMENT (OUTPATIENT)
Dept: GASTROENTEROLOGY | Facility: CLINIC | Age: 43
End: 2023-05-22

## 2023-06-01 ENCOUNTER — APPOINTMENT (OUTPATIENT)
Dept: OTOLARYNGOLOGY | Facility: CLINIC | Age: 43
End: 2023-06-01

## 2023-07-11 ENCOUNTER — APPOINTMENT (OUTPATIENT)
Dept: INTERNAL MEDICINE | Facility: CLINIC | Age: 43
End: 2023-07-11

## 2023-08-04 ENCOUNTER — APPOINTMENT (OUTPATIENT)
Dept: INTERNAL MEDICINE | Facility: CLINIC | Age: 43
End: 2023-08-04
Payer: COMMERCIAL

## 2023-08-04 VITALS — WEIGHT: 141 LBS | BODY MASS INDEX: 27.68 KG/M2 | HEART RATE: 70 BPM | OXYGEN SATURATION: 97 % | HEIGHT: 60 IN

## 2023-08-04 VITALS — SYSTOLIC BLOOD PRESSURE: 120 MMHG | DIASTOLIC BLOOD PRESSURE: 84 MMHG

## 2023-08-04 PROCEDURE — 93000 ELECTROCARDIOGRAM COMPLETE: CPT | Mod: 59

## 2023-08-04 PROCEDURE — 99213 OFFICE O/P EST LOW 20 MIN: CPT | Mod: 25

## 2023-08-04 PROCEDURE — 99396 PREV VISIT EST AGE 40-64: CPT | Mod: 25

## 2023-08-04 PROCEDURE — G0444 DEPRESSION SCREEN ANNUAL: CPT | Mod: 59

## 2023-08-04 NOTE — PHYSICAL EXAM
[No Acute Distress] : no acute distress [Well Nourished] : well nourished [Well Developed] : well developed [Normal Sclera/Conjunctiva] : normal sclera/conjunctiva [PERRL] : pupils equal round and reactive to light [EOMI] : extraocular movements intact [Normal Outer Ear/Nose] : the outer ears and nose were normal in appearance [Normal Oropharynx] : the oropharynx was normal [Normal TMs] : both tympanic membranes were normal [No Lymphadenopathy] : no lymphadenopathy [Thyroid Normal, No Nodules] : the thyroid was normal and there were no nodules present [No Carotid Bruits] : no carotid bruits [No Abdominal Bruit] : a ~M bruit was not heard ~T in the abdomen [No Varicosities] : no varicosities [No Edema] : there was no peripheral edema [No Palpable Aorta] : no palpable aorta [Declined Breast Exam] : declined breast exam  [Normal] : no posterior cervical lymphadenopathy and no anterior cervical lymphadenopathy [No CVA Tenderness] : no CVA  tenderness [No Spinal Tenderness] : no spinal tenderness [No Joint Swelling] : no joint swelling [No Rash] : no rash [Coordination Grossly Intact] : coordination grossly intact [No Focal Deficits] : no focal deficits [Normal Gait] : normal gait [Normal Affect] : the affect was normal [Normal Insight/Judgement] : insight and judgment were intact

## 2023-08-04 NOTE — HEALTH RISK ASSESSMENT
[0] : 2) Feeling down, depressed, or hopeless: Not at all (0) [PHQ-2 Negative - No further assessment needed] : PHQ-2 Negative - No further assessment needed [Patient reported mammogram was normal] : Patient reported mammogram was normal [Patient reported PAP Smear was normal] : Patient reported PAP Smear was normal [BUA2Pbccd] : 0 [MammogramDate] : 2023 [PapSmearDate] : 2023

## 2023-08-14 ENCOUNTER — LABORATORY RESULT (OUTPATIENT)
Age: 43
End: 2023-08-14

## 2023-08-28 LAB — HBV SURFACE AB SER QL: REACTIVE

## 2023-09-06 ENCOUNTER — APPOINTMENT (OUTPATIENT)
Dept: ORTHOPEDIC SURGERY | Facility: CLINIC | Age: 43
End: 2023-09-06
Payer: COMMERCIAL

## 2023-09-06 DIAGNOSIS — M54.30 SCIATICA, UNSPECIFIED SIDE: ICD-10-CM

## 2023-09-06 PROCEDURE — 99214 OFFICE O/P EST MOD 30 MIN: CPT

## 2023-09-06 NOTE — PHYSICAL EXAM
[de-identified] : Lumbar Physical Exam  Gait - Normal  Station - Normal  Sagittal balance - Normal  Compensatory mechanism? - None  Difficulty with toe walk  Reflexes Patellar - normal Gastroc - normal Clonus - No  Hip Exam - Normal  Straight leg raise - none  Pulses - 2+ dp/pt  Range of motion - normal  Sensation  Sensation intact to light touch in L1, L2, L3, L4, L5 and S1 dermatomes bilaterally  Motor 	IP	Quad	HS	TA	Gastroc	EHL Right	5/5	5/5	5/5	5/5	5/5	5/5 Left	5/5	5/5	5/5	5/5	4/5	5/5 [de-identified] : X-rays reviewed\par  Disc degeneration noted at L5-S1\par  \par  Lumbar MRI reviewed\par  Large left-sided L5-S1 disc herniation\par  Impingement on the descending S1 nerve root

## 2023-09-06 NOTE — HISTORY OF PRESENT ILLNESS
[de-identified] : 42 year old female who presents for follow-up evaluation of her lower back pain and radicular sxs down her LT lower extremity. Patient reports her sxs have resolved but reports recent exacerbation after dancing at a wedding a few days ago.  Denies any bowel/bladder incontinence. Denies any saddle anesthesia.   05/19/2022 Today the patient states that she is still dealing with some significant left-sided leg pain.  It does go down her posterior thigh posterior calf.  She does state that the symptoms are disabling at times.  Fortunately since her last visit she has had some improvement in her pain but the discomfort is still present.  She denies any bowel bladder issues.  She denies any saddle anesthesia.

## 2023-09-06 NOTE — ASSESSMENT
[FreeTextEntry1] : I had a lengthy discussion with the patient in regards to their treatment plan and diagnosis. Their symptoms have persisted despite the conservative management they have attempted thus far.  We did briefly broach the topic of surgery for her sxs,  but the patient would like to hold off and proceed with conservative management. As a result I would like to proceed with a referral to [facility] [place] for an WADE.  In tandem with this they should continue with physical therapy/home therapy program. The patient can take Tylenol/NSAIDs as needed for pain control if medically able to. I will have the patient follow-up in 3 to 4 weeks for repeat clinical evaluation.  I encouraged them to follow-up sooner if their symptoms worsen or change in any way.

## 2023-09-06 NOTE — ADDENDUM
[FreeTextEntry1] : I, Steffany Jackson, acted solely as a scribe for Dr. Tres Armas MD on this date 09/06/2023    All medical record entries made by the Scribe were at my, Dr. Tres Armas MD., direction and personally dictated by me on 09/06/2023 . I have reviewed the chart and agree that the record accurately reflects my personal performance of the history, physical exam, assessment and plan. I have also personally directed, reviewed, and agreed with the chart.

## 2023-09-12 ENCOUNTER — APPOINTMENT (OUTPATIENT)
Dept: PSYCHIATRY | Facility: CLINIC | Age: 43
End: 2023-09-12

## 2023-09-18 ENCOUNTER — APPOINTMENT (OUTPATIENT)
Dept: RADIOLOGY | Facility: CLINIC | Age: 43
End: 2023-09-18

## 2023-09-27 ENCOUNTER — OUTPATIENT (OUTPATIENT)
Dept: OUTPATIENT SERVICES | Facility: HOSPITAL | Age: 43
LOS: 1 days | End: 2023-09-27
Payer: COMMERCIAL

## 2023-09-27 ENCOUNTER — RESULT REVIEW (OUTPATIENT)
Age: 43
End: 2023-09-27

## 2023-09-27 ENCOUNTER — APPOINTMENT (OUTPATIENT)
Dept: RADIOLOGY | Facility: CLINIC | Age: 43
End: 2023-09-27
Payer: COMMERCIAL

## 2023-09-27 DIAGNOSIS — M54.30 SCIATICA, UNSPECIFIED SIDE: ICD-10-CM

## 2023-09-27 PROCEDURE — 62323 NJX INTERLAMINAR LMBR/SAC: CPT

## 2023-10-03 ENCOUNTER — APPOINTMENT (OUTPATIENT)
Dept: CARDIOLOGY | Facility: CLINIC | Age: 43
End: 2023-10-03
Payer: COMMERCIAL

## 2023-10-03 ENCOUNTER — NON-APPOINTMENT (OUTPATIENT)
Age: 43
End: 2023-10-03

## 2023-10-03 VITALS
DIASTOLIC BLOOD PRESSURE: 80 MMHG | OXYGEN SATURATION: 97 % | WEIGHT: 140 LBS | BODY MASS INDEX: 27.48 KG/M2 | SYSTOLIC BLOOD PRESSURE: 117 MMHG | HEART RATE: 105 BPM | HEIGHT: 60 IN

## 2023-10-03 VITALS
OXYGEN SATURATION: 97 % | HEIGHT: 60 IN | BODY MASS INDEX: 27.48 KG/M2 | SYSTOLIC BLOOD PRESSURE: 117 MMHG | HEART RATE: 105 BPM | DIASTOLIC BLOOD PRESSURE: 80 MMHG | WEIGHT: 140 LBS

## 2023-10-03 DIAGNOSIS — Z78.9 OTHER SPECIFIED HEALTH STATUS: ICD-10-CM

## 2023-10-03 DIAGNOSIS — Z80.0 FAMILY HISTORY OF MALIGNANT NEOPLASM OF DIGESTIVE ORGANS: ICD-10-CM

## 2023-10-03 DIAGNOSIS — Z87.891 PERSONAL HISTORY OF NICOTINE DEPENDENCE: ICD-10-CM

## 2023-10-03 PROCEDURE — 99204 OFFICE O/P NEW MOD 45 MIN: CPT

## 2023-10-03 PROCEDURE — 93000 ELECTROCARDIOGRAM COMPLETE: CPT

## 2023-10-03 RX ORDER — TIZANIDINE 2 MG/1
2 TABLET ORAL EVERY 6 HOURS
Qty: 24 | Refills: 0 | Status: COMPLETED | COMMUNITY
Start: 2023-09-06 | End: 2023-10-03

## 2023-10-03 RX ORDER — GABAPENTIN 100 MG/1
100 CAPSULE ORAL
Qty: 20 | Refills: 0 | Status: COMPLETED | COMMUNITY
Start: 2022-04-26 | End: 2023-10-03

## 2023-10-03 RX ORDER — PREDNISONE 10 MG/1
10 TABLET ORAL
Qty: 39 | Refills: 0 | Status: COMPLETED | COMMUNITY
Start: 2023-09-06 | End: 2023-10-03

## 2023-10-03 RX ORDER — ESCITALOPRAM OXALATE 10 MG/1
10 TABLET ORAL DAILY
Qty: 90 | Refills: 1 | Status: COMPLETED | COMMUNITY
Start: 2022-05-17 | End: 2023-10-03

## 2023-10-04 ENCOUNTER — APPOINTMENT (OUTPATIENT)
Dept: CARDIOLOGY | Facility: CLINIC | Age: 43
End: 2023-10-04
Payer: COMMERCIAL

## 2023-10-04 ENCOUNTER — APPOINTMENT (OUTPATIENT)
Dept: ORTHOPEDIC SURGERY | Facility: CLINIC | Age: 43
End: 2023-10-04

## 2023-10-04 PROCEDURE — 93306 TTE W/DOPPLER COMPLETE: CPT

## 2023-10-10 DIAGNOSIS — Z00.00 ENCOUNTER FOR GENERAL ADULT MEDICAL EXAMINATION W/OUT ABNORMAL FINDINGS: ICD-10-CM

## 2023-10-14 ENCOUNTER — APPOINTMENT (OUTPATIENT)
Dept: MAMMOGRAPHY | Facility: CLINIC | Age: 43
End: 2023-10-14

## 2023-10-21 ENCOUNTER — APPOINTMENT (OUTPATIENT)
Dept: MAMMOGRAPHY | Facility: CLINIC | Age: 43
End: 2023-10-21

## 2023-11-15 ENCOUNTER — APPOINTMENT (OUTPATIENT)
Dept: CARDIOLOGY | Facility: CLINIC | Age: 43
End: 2023-11-15

## 2024-03-15 ENCOUNTER — APPOINTMENT (OUTPATIENT)
Dept: OTOLARYNGOLOGY | Facility: CLINIC | Age: 44
End: 2024-03-15
Payer: COMMERCIAL

## 2024-03-15 VITALS
BODY MASS INDEX: 27.48 KG/M2 | RESPIRATION RATE: 18 BRPM | HEIGHT: 60 IN | WEIGHT: 140 LBS | DIASTOLIC BLOOD PRESSURE: 82 MMHG | SYSTOLIC BLOOD PRESSURE: 115 MMHG

## 2024-03-15 PROCEDURE — 99203 OFFICE O/P NEW LOW 30 MIN: CPT

## 2024-03-15 NOTE — REASON FOR VISIT
[Initial Consultation] : an initial consultation for [FreeTextEntry2] : Nasal congestion, deviated septum

## 2024-03-15 NOTE — REVIEW OF SYSTEMS
[As Noted in HPI] : as noted in HPI [Nose Bleeds] : no nose bleeds [Nasal Congestion] : nasal congestion [Sinus Pressure] : no sinus pressure [Sinus Pain] : no sinus pain [Negative] : Endocrine

## 2024-03-15 NOTE — HISTORY OF PRESENT ILLNESS
[de-identified] : Ms. MELENDEZ is a 43 year-old female who presents with persistent nasal congestion and deviated septum.  She reports that she has been using Flonase for at least a month with some relief but not completely.  She denies epistaxis, no sinus infections, no fever, no pain. [Nasal Congestion] : nasal congestion

## 2024-03-15 NOTE — CONSULT LETTER
[Dear  ___] : Dear  [unfilled], [Courtesy Letter:] : I had the pleasure of seeing your patient, [unfilled], in my office today. [Please see my note below.] : Please see my note below. [Sincerely,] : Sincerely, [FreeTextEntry2] : Jose Gallardo MD [FreeTextEntry3] : Jersey Kenny, KALEIGH, PASabineC Sr. Physician Assistant, Otolaryngology at [facility] Adjunct Clinical Instructor, Department of Physician Assistant Studies, [address] [address]

## 2024-03-25 ENCOUNTER — APPOINTMENT (OUTPATIENT)
Dept: OTOLARYNGOLOGY | Facility: CLINIC | Age: 44
End: 2024-03-25
Payer: COMMERCIAL

## 2024-03-25 PROCEDURE — 31231 NASAL ENDOSCOPY DX: CPT

## 2024-03-25 PROCEDURE — 99244 OFF/OP CNSLTJ NEW/EST MOD 40: CPT | Mod: 25,57

## 2024-03-25 RX ORDER — TIZANIDINE 2 MG/1
2 TABLET ORAL EVERY 6 HOURS
Qty: 40 | Refills: 0 | Status: COMPLETED | COMMUNITY
Start: 2022-06-10 | End: 2024-03-25

## 2024-03-25 RX ORDER — METRONIDAZOLE 7.5 MG/G
0.75 GEL VAGINAL
Qty: 1 | Refills: 0 | Status: COMPLETED | COMMUNITY
Start: 2023-04-26 | End: 2024-03-25

## 2024-03-25 RX ORDER — ETONOGESTREL AND ETHINYL ESTRADIOL 11.7; 2.7 MG/1; MG/1
0.12-0.015 INSERT, EXTENDED RELEASE VAGINAL
Qty: 1 | Refills: 1 | Status: COMPLETED | COMMUNITY
Start: 2021-05-24 | End: 2024-03-25

## 2024-03-25 RX ORDER — MISOPROSTOL 200 UG/1
200 TABLET ORAL
Qty: 2 | Refills: 0 | Status: COMPLETED | COMMUNITY
Start: 2021-05-19 | End: 2024-03-25

## 2024-03-25 RX ORDER — ALPRAZOLAM 0.25 MG/1
0.25 TABLET ORAL
Qty: 30 | Refills: 0 | Status: COMPLETED | COMMUNITY
Start: 2022-05-17 | End: 2024-03-25

## 2024-03-25 RX ORDER — MISOPROSTOL 200 UG/1
200 TABLET ORAL
Qty: 2 | Refills: 0 | Status: COMPLETED | COMMUNITY
Start: 2022-06-13 | End: 2024-03-25

## 2024-03-25 RX ORDER — TERCONAZOLE 80 MG/1
80 SUPPOSITORY VAGINAL
Qty: 3 | Refills: 1 | Status: COMPLETED | COMMUNITY
Start: 2023-05-19 | End: 2024-03-25

## 2024-03-25 NOTE — ADDENDUM
[FreeTextEntry1] : Scribe Attestation: I, Juan A Orellana, am scribing for and in the presence of Dr. Juventino Underwood in the following sections HISTORY OF PRESENT ILLNESS, PAST MEDICAL/FAMILY/SOCIAL HISTORY; REVIEW OF SYSTEMS; VITAL SIGNS; PHYSICAL EXAM; PROCEDURES; DISCUSSION.   I, Dr. Juventino Underwood, personally performed the services described in the documentation, reviewed the documentation recorded by the scribe in my presence, and it accurately and completely records my words and actions.

## 2024-03-25 NOTE — PHYSICAL EXAM
[Nasal Endoscopy Performed] : nasal endoscopy was performed, see procedure section for findings [] : septum deviated bilaterally [Midline] : trachea located in midline position [Normal] : orientation to person, place, and time: normal [FreeTextEntry1] : deviated septum, nasal congestion, PND [de-identified] : tremendous L inferior turbinate hypertrophy

## 2024-03-25 NOTE — PROCEDURE
[Image(s) Captured] : image(s) captured and filed [Video Captured] : video captured and filed [Topical Lidocaine] : topical lidocaine [Oxymetazoline HCl] : oxymetazoline HCl [Flexible Endoscope] : examined with the flexible endoscope [Serial Number: ___] : Serial Number: [unfilled] [Recalcitrant Symptoms] : recalcitrant symptoms  [Anterior rhinoscopy insufficient to account for symptoms] : anterior rhinoscopy insufficient to account for symptoms [Congested] : congested [___ % Obstructed] : [unfilled]U% obstructed [S-Shaped Deviated] : S-shape deviation [Normal] : the paranasal sinuses had no abnormalities [FreeTextEntry6] : Pre-op indication(s): deviated septum Post-op indication(s): deviated septum  Verbal consent obtained from patient. Anterior rhinoscopy insufficient to account for symptoms  Details for procedure:  Scope #: 36 Type of scope:    flexible fiber optic telescope   Anesthesia and/or vasoconstriction was achieved topically by usin% Lidocaine spray   0.05% Oxymetazoline     Other ______  The following anatomic sites were directly examined in a sequential fashion:  The scope was introduced in the nasal passage between the middle and inferior turbinates to exam the inferior portion of the middle meatus and the fontanelle, as well as the maxillary ostia. Next, the scope was passed medially and posteriorly to the middle turbinates to examine the sphenoethmoid recess and the superior turbinate region.  Upon visualization the finders are as follows:  Nasal Septum:   Deviated to   right  and left Bleeding site cauterized:    Anterior   left   right   Posterior   left   right  Method:   Silver Nitrate   YAG Laser    Electrocautery ______  Right Side:  * Mucosa: Normal * Mucous: Normal * Polyp: Normal * Inferior Turbinate: hypertrophy * Middle Turbinate: Normal * Superior Turbinate: Normal * Inferior Meatus: Normal * Middle Meatus: Normal * Super Meatus: Normal * Sphenoethmoidal Recess: Normal Left Side:  * Mucosa: Normal * Mucous: Normal * Polyp: Normal * Inferior Turbinate: hypertrophy * Middle Turbinate: Normal * Superior Turbinate: Normal * Inferior Meatus: Normal * Middle Meatus: Normal * Super Meatus: Normal * Sphenoethmoidal Recess: Normal The patient tolerated the procedure well without any complications.

## 2024-03-25 NOTE — HISTORY OF PRESENT ILLNESS
[Nasal Congestion] : nasal congestion [de-identified] : Ms. MELENDEZ is a 43 year-old female who presents with a deviated septum, difficulty breathing through the nose.  Denies current nasal congestion, sinus pain, sinus pressure, post nasal drip.  Denies sinus infections in the past year.  She has been on Flonase for the past month w/o relief in nasal breathing. Patient states she has difficulty with cardiovascular activity including shortness of breath. She complains she is unable to exercise.

## 2024-03-25 NOTE — CONSULT LETTER
[Dear  ___] : Dear  [unfilled], [Courtesy Letter:] : I had the pleasure of seeing your patient, [unfilled], in my office today. [Please see my note below.] : Please see my note below. [Sincerely,] : Sincerely, [FreeTextEntry2] : Lenora Gallardo [FreeTextEntry3] : Juventino Underwood MD, KALEIGH, FACS  Department Otolaryngology Director of [facility] Professor of Otolaryngology,  Rossy Gaines/[facility] [employer] [facility]

## 2024-05-29 ENCOUNTER — OUTPATIENT (OUTPATIENT)
Dept: OUTPATIENT SERVICES | Facility: HOSPITAL | Age: 44
LOS: 1 days | End: 2024-05-29
Payer: COMMERCIAL

## 2024-05-29 VITALS
RESPIRATION RATE: 18 BRPM | SYSTOLIC BLOOD PRESSURE: 116 MMHG | WEIGHT: 147.71 LBS | DIASTOLIC BLOOD PRESSURE: 81 MMHG | HEIGHT: 60 IN | OXYGEN SATURATION: 100 % | HEART RATE: 90 BPM | TEMPERATURE: 98 F

## 2024-05-29 DIAGNOSIS — Z01.818 ENCOUNTER FOR OTHER PREPROCEDURAL EXAMINATION: ICD-10-CM

## 2024-05-29 DIAGNOSIS — Z98.890 OTHER SPECIFIED POSTPROCEDURAL STATES: Chronic | ICD-10-CM

## 2024-05-29 DIAGNOSIS — J34.3 HYPERTROPHY OF NASAL TURBINATES: ICD-10-CM

## 2024-05-29 DIAGNOSIS — J34.2 DEVIATED NASAL SEPTUM: ICD-10-CM

## 2024-05-29 DIAGNOSIS — Z98.82 BREAST IMPLANT STATUS: Chronic | ICD-10-CM

## 2024-05-29 DIAGNOSIS — Z98.891 HISTORY OF UTERINE SCAR FROM PREVIOUS SURGERY: Chronic | ICD-10-CM

## 2024-05-29 PROCEDURE — G0463: CPT

## 2024-05-29 PROCEDURE — 36415 COLL VENOUS BLD VENIPUNCTURE: CPT

## 2024-05-29 PROCEDURE — 85027 COMPLETE CBC AUTOMATED: CPT

## 2024-05-29 PROCEDURE — 80048 BASIC METABOLIC PNL TOTAL CA: CPT

## 2024-05-29 NOTE — H&P PST ADULT - NSICDXPASTSURGICALHX_GEN_ALL_CORE_FT
PAST SURGICAL HISTORY:  History of 2  sections     History of abdominoplasty     History of breast augmentation

## 2024-05-29 NOTE — H&P PST ADULT - NSANTHOSAYNRD_GEN_A_CORE
neck 13 inches/No. ELAINE screening performed.  STOP BANG Legend: 0-2 = LOW Risk; 3-4 = INTERMEDIATE Risk; 5-8 = HIGH Risk

## 2024-05-29 NOTE — H&P PST ADULT - HISTORY OF PRESENT ILLNESS
Patient is a 43 year old F with PMHx of ___ with no pertinent medical history presents for preoperative testing for septoplasty turbinectomy with Dr. Juventino Underwood scheduled for 06/10/2024. Reports____. Denies any acute symptoms at this time. Otherwise, patient states feeling well overall.     havign surgeyrt to breathing thru nose, went to see ent pa, referre to surgeon  Patient is a 43 year old F with no pertinent medical history presents for preoperative testing for septoplasty turbinectomy with Dr. Juventino Underwood scheduled for 06/10/2024. Reports being unable to breathe through nostril for years, when she had the flu, breathing through her nose became harder, referred to ENT, was recommended for upcoming surgery. Denies any acute symptoms at this time. Otherwise, patient states feeling well overall.

## 2024-05-30 ENCOUNTER — APPOINTMENT (OUTPATIENT)
Dept: INTERNAL MEDICINE | Facility: CLINIC | Age: 44
End: 2024-05-30

## 2024-05-30 PROBLEM — J34.2 DEVIATED NASAL SEPTUM: Chronic | Status: ACTIVE | Noted: 2024-05-29

## 2024-06-04 ENCOUNTER — APPOINTMENT (OUTPATIENT)
Dept: INTERNAL MEDICINE | Facility: CLINIC | Age: 44
End: 2024-06-04
Payer: COMMERCIAL

## 2024-06-04 VITALS
HEIGHT: 59 IN | TEMPERATURE: 98.4 F | HEART RATE: 89 BPM | WEIGHT: 145 LBS | DIASTOLIC BLOOD PRESSURE: 80 MMHG | SYSTOLIC BLOOD PRESSURE: 120 MMHG | BODY MASS INDEX: 29.23 KG/M2 | OXYGEN SATURATION: 96 %

## 2024-06-04 DIAGNOSIS — Z01.818 ENCOUNTER FOR OTHER PREPROCEDURAL EXAMINATION: ICD-10-CM

## 2024-06-04 DIAGNOSIS — I49.3 VENTRICULAR PREMATURE DEPOLARIZATION: ICD-10-CM

## 2024-06-04 PROCEDURE — 99215 OFFICE O/P EST HI 40 MIN: CPT

## 2024-06-04 PROCEDURE — 93000 ELECTROCARDIOGRAM COMPLETE: CPT

## 2024-06-04 NOTE — HISTORY OF PRESENT ILLNESS
[No Pertinent Cardiac History] : no history of aortic stenosis, atrial fibrillation, coronary artery disease, recent myocardial infarction, or implantable device/pacemaker [No Pertinent Pulmonary History] : no history of asthma, COPD, sleep apnea, or smoking [No Adverse Anesthesia Reaction] : no adverse anesthesia reaction in self or family member [Chronic Anticoagulation] : no chronic anticoagulation [Chronic Kidney Disease] : no chronic kidney disease [Diabetes] : no diabetes [(Patient denies any chest pain, claudication, dyspnea on exertion, orthopnea, palpitations or syncope)] : Patient denies any chest pain, claudication, dyspnea on exertion, orthopnea, palpitations or syncope [Good (7-10 METs)] : Good (7-10 METs) [FreeTextEntry1] : Deviated septum [FreeTextEntry2] : 6/10/2024 [FreeTextEntry3] : Dr. Juventino Underwood [FreeTextEntry4] : Patient with history of PVCs presents to office for preoperative evaluation. Overall she feels well and denies any exertional chest pains, shortness of breath, palpitations. She had a cardiac workup with an echocardiogram that was normal as well as a Holter monitor and did not require any long-term therapy  [FreeTextEntry7] : Echo: 10/2023: Within normal limits Holter monitor: Isolated VPCs, no couplets or triplets there was some ventricular bigeminy and trigeminy

## 2024-06-04 NOTE — ASSESSMENT
[Patient Optimized for Surgery] : Patient optimized for surgery [No Further Testing Recommended] : no further testing recommended [As per surgery] : as per surgery [FreeTextEntry4] : Patient in most optimal medical condition for proposed procedure

## 2024-06-04 NOTE — PHYSICAL EXAM
[Normal Sclera/Conjunctiva] : normal sclera/conjunctiva [PERRL] : pupils equal round and reactive to light [EOMI] : extraocular movements intact [Normal Oropharynx] : the oropharynx was normal [No Lymphadenopathy] : no lymphadenopathy [Thyroid Normal, No Nodules] : the thyroid was normal and there were no nodules present [No Carotid Bruits] : no carotid bruits [No Abdominal Bruit] : a ~M bruit was not heard ~T in the abdomen [No Varicosities] : no varicosities [No Edema] : there was no peripheral edema [No Palpable Aorta] : no palpable aorta [Normal] : soft, non-tender, non-distended, no masses palpated, no HSM and normal bowel sounds [Normal Supraclavicular Nodes] : no supraclavicular lymphadenopathy [Normal Posterior Cervical Nodes] : no posterior cervical lymphadenopathy [Normal Anterior Cervical Nodes] : no anterior cervical lymphadenopathy [No CVA Tenderness] : no CVA  tenderness [No Rash] : no rash [Coordination Grossly Intact] : coordination grossly intact [No Focal Deficits] : no focal deficits [Normal Gait] : normal gait [Normal Affect] : the affect was normal [Normal Insight/Judgement] : insight and judgment were intact

## 2024-06-05 NOTE — ED ADULT NURSE NOTE - NS ED NURSE RECORD ANOTHER HT AND WT
General: uncomfortable appearing female, no acute distress   HEENT: normocephalic, atraumatic   Respiratory: normal work of breathing  Abdomen: soft, rlq tenderness to palpation   Skin: warm, dry   Neuro: A&Ox3  Psych: appropriate affect
Yes

## 2024-06-09 ENCOUNTER — TRANSCRIPTION ENCOUNTER (OUTPATIENT)
Age: 44
End: 2024-06-09

## 2024-06-10 ENCOUNTER — APPOINTMENT (OUTPATIENT)
Dept: OTOLARYNGOLOGY | Facility: HOSPITAL | Age: 44
End: 2024-06-10
Payer: COMMERCIAL

## 2024-06-10 ENCOUNTER — TRANSCRIPTION ENCOUNTER (OUTPATIENT)
Age: 44
End: 2024-06-10

## 2024-06-10 ENCOUNTER — OUTPATIENT (OUTPATIENT)
Dept: OUTPATIENT SERVICES | Facility: HOSPITAL | Age: 44
LOS: 1 days | End: 2024-06-10
Payer: COMMERCIAL

## 2024-06-10 ENCOUNTER — RESULT REVIEW (OUTPATIENT)
Age: 44
End: 2024-06-10

## 2024-06-10 VITALS
SYSTOLIC BLOOD PRESSURE: 110 MMHG | RESPIRATION RATE: 13 BRPM | TEMPERATURE: 98 F | WEIGHT: 147.71 LBS | HEIGHT: 60 IN | HEART RATE: 53 BPM | DIASTOLIC BLOOD PRESSURE: 66 MMHG | OXYGEN SATURATION: 97 %

## 2024-06-10 VITALS
DIASTOLIC BLOOD PRESSURE: 63 MMHG | OXYGEN SATURATION: 95 % | SYSTOLIC BLOOD PRESSURE: 105 MMHG | RESPIRATION RATE: 16 BRPM | HEART RATE: 94 BPM | TEMPERATURE: 98 F

## 2024-06-10 DIAGNOSIS — Z98.82 BREAST IMPLANT STATUS: Chronic | ICD-10-CM

## 2024-06-10 DIAGNOSIS — J34.2 DEVIATED NASAL SEPTUM: ICD-10-CM

## 2024-06-10 DIAGNOSIS — J34.3 HYPERTROPHY OF NASAL TURBINATES: ICD-10-CM

## 2024-06-10 DIAGNOSIS — Z98.890 OTHER SPECIFIED POSTPROCEDURAL STATES: Chronic | ICD-10-CM

## 2024-06-10 DIAGNOSIS — Z98.891 HISTORY OF UTERINE SCAR FROM PREVIOUS SURGERY: Chronic | ICD-10-CM

## 2024-06-10 PROCEDURE — 30520 REPAIR OF NASAL SEPTUM: CPT

## 2024-06-10 PROCEDURE — 30130 EXCISE INFERIOR TURBINATE: CPT | Mod: 50

## 2024-06-10 PROCEDURE — ZZZZZ: CPT

## 2024-06-10 PROCEDURE — 88300 SURGICAL PATH GROSS: CPT

## 2024-06-10 PROCEDURE — 88300 SURGICAL PATH GROSS: CPT | Mod: 26

## 2024-06-10 RX ORDER — SODIUM CHLORIDE 9 MG/ML
1000 INJECTION, SOLUTION INTRAVENOUS
Refills: 0 | Status: DISCONTINUED | OUTPATIENT
Start: 2024-06-10 | End: 2024-06-10

## 2024-06-10 RX ORDER — OXYCODONE AND ACETAMINOPHEN 5; 325 MG/1; MG/1
1 TABLET ORAL EVERY 4 HOURS
Refills: 0 | Status: DISCONTINUED | OUTPATIENT
Start: 2024-06-10 | End: 2024-06-10

## 2024-06-10 RX ORDER — CEPHALEXIN 500 MG
1 CAPSULE ORAL
Qty: 28 | Refills: 0
Start: 2024-06-10 | End: 2024-06-16

## 2024-06-10 RX ORDER — HYDROMORPHONE HYDROCHLORIDE 2 MG/ML
0.5 INJECTION INTRAMUSCULAR; INTRAVENOUS; SUBCUTANEOUS
Refills: 0 | Status: DISCONTINUED | OUTPATIENT
Start: 2024-06-10 | End: 2024-06-10

## 2024-06-10 RX ORDER — ONDANSETRON 8 MG/1
4 TABLET, FILM COATED ORAL ONCE
Refills: 0 | Status: DISCONTINUED | OUTPATIENT
Start: 2024-06-10 | End: 2024-06-10

## 2024-06-10 RX ORDER — HYDROMORPHONE HYDROCHLORIDE 2 MG/ML
1 INJECTION INTRAMUSCULAR; INTRAVENOUS; SUBCUTANEOUS
Refills: 0 | Status: DISCONTINUED | OUTPATIENT
Start: 2024-06-10 | End: 2024-06-10

## 2024-06-10 RX ORDER — OXYCODONE AND ACETAMINOPHEN 5; 325 MG/1; MG/1
1 TABLET ORAL
Qty: 12 | Refills: 0
Start: 2024-06-10 | End: 2024-06-12

## 2024-06-10 RX ADMIN — SODIUM CHLORIDE 50 MILLILITER(S): 9 INJECTION, SOLUTION INTRAVENOUS at 06:14

## 2024-06-10 NOTE — ASU DISCHARGE PLAN (ADULT/PEDIATRIC) - CARE PROVIDER_API CALL
[facility]  Physician Assistant Services  [address] [place] [address]  Phone: [phone]  Fax: [phone]  Established Patient  Scheduled Appointment: 06/11/2024 09:30 AM

## 2024-06-10 NOTE — ASU DISCHARGE PLAN (ADULT/PEDIATRIC) - DIET/FLUID RESTRICTION
Continues to rest quietly, resp even, unlab with O2, facial expression relaxed with no distress noted during report given to Ranjit Quintanilla RN. No change/Progress slowly

## 2024-06-10 NOTE — ASU PATIENT PROFILE, ADULT - FALL HARM RISK - UNIVERSAL INTERVENTIONS
Bed in lowest position, wheels locked, appropriate side rails in place/Call bell, personal items and telephone in reach/Instruct patient to call for assistance before getting out of bed or chair/Non-slip footwear when patient is out of bed/[place] to call system/Physically safe environment - no spills, clutter or unnecessary equipment/Purposeful Proactive Rounding/Room/bathroom lighting operational, light cord in reach

## 2024-06-10 NOTE — BRIEF OPERATIVE NOTE - NSICDXBRIEFPREOP_GEN_ALL_CORE_FT
PRE-OP DIAGNOSIS:  Deviated septum 10-Collin-2024 09:42:39  [facility]  Nasal turbinate hypertrophy 10-Collin-2024 09:42:45  [facility]  
No

## 2024-06-10 NOTE — BRIEF OPERATIVE NOTE - NSICDXBRIEFPOSTOP_GEN_ALL_CORE_FT
POST-OP DIAGNOSIS:  Deviated septum 10-Collin-2024 09:42:54  [facility]  Nasal turbinate hypertrophy 10-Collin-2024 09:43:05  [facility]

## 2024-06-10 NOTE — ASU DISCHARGE PLAN (ADULT/PEDIATRIC) - PROVIDER TOKENS
PROVIDER:[TOKEN:[[id number]],SCHEDULEDAPPT:[06/11/2024],SCHEDULEDAPPTTIME:[09:30 AM],ESTABLISHEDPATIENT:[T]]

## 2024-06-11 ENCOUNTER — APPOINTMENT (OUTPATIENT)
Dept: OTOLARYNGOLOGY | Facility: CLINIC | Age: 44
End: 2024-06-11

## 2024-06-11 VITALS — TEMPERATURE: 97.5 F | OXYGEN SATURATION: 99 % | HEART RATE: 96 BPM

## 2024-06-11 LAB — SURGICAL PATHOLOGY STUDY: SIGNIFICANT CHANGE UP

## 2024-06-11 PROCEDURE — 99024 POSTOP FOLLOW-UP VISIT: CPT

## 2024-06-11 NOTE — ASSESSMENT
[FreeTextEntry1] : -  Nasal packing removed without any difficulties and patient is breathing well -  Nasal saline spray -  Follow up next week for splint removal.

## 2024-06-11 NOTE — HISTORY OF PRESENT ILLNESS
[de-identified] : Ms. MELENDEZ is a 43 year-old female who presents one day post septoplasty and turbinectomy without any complaints.  She is doing well and recovering well, reporting congestion due to nasal packing.

## 2024-06-11 NOTE — PHYSICAL EXAM
[Midline] : trachea located in midline position [Normal] : no rashes [de-identified] : nasal splints intact.

## 2024-06-18 ENCOUNTER — APPOINTMENT (OUTPATIENT)
Dept: OTOLARYNGOLOGY | Facility: CLINIC | Age: 44
End: 2024-06-18

## 2024-06-18 VITALS — OXYGEN SATURATION: 99 % | HEART RATE: 46 BPM | TEMPERATURE: 97 F

## 2024-06-18 DIAGNOSIS — J34.2 DEVIATED NASAL SEPTUM: ICD-10-CM

## 2024-06-18 DIAGNOSIS — J34.3 HYPERTROPHY OF NASAL TURBINATES: ICD-10-CM

## 2024-06-18 PROCEDURE — 99024 POSTOP FOLLOW-UP VISIT: CPT

## 2024-06-18 NOTE — HISTORY OF PRESENT ILLNESS
[de-identified] : Ms. MELENDEZ is a 43 year-old female who presents post-op for splint removal. Denies any bleeding or concerns.

## 2024-07-31 ENCOUNTER — APPOINTMENT (OUTPATIENT)
Dept: OBGYN | Facility: CLINIC | Age: 44
End: 2024-07-31

## 2024-08-05 ENCOUNTER — APPOINTMENT (OUTPATIENT)
Dept: INTERNAL MEDICINE | Facility: CLINIC | Age: 44
End: 2024-08-05

## 2024-09-18 ENCOUNTER — NON-APPOINTMENT (OUTPATIENT)
Age: 44
End: 2024-09-18

## 2024-09-18 ENCOUNTER — APPOINTMENT (OUTPATIENT)
Dept: OBGYN | Facility: CLINIC | Age: 44
End: 2024-09-18
Payer: COMMERCIAL

## 2024-09-18 ENCOUNTER — APPOINTMENT (OUTPATIENT)
Dept: INTERNAL MEDICINE | Facility: CLINIC | Age: 44
End: 2024-09-18
Payer: COMMERCIAL

## 2024-09-18 VITALS — DIASTOLIC BLOOD PRESSURE: 68 MMHG | SYSTOLIC BLOOD PRESSURE: 110 MMHG

## 2024-09-18 VITALS
DIASTOLIC BLOOD PRESSURE: 87 MMHG | SYSTOLIC BLOOD PRESSURE: 120 MMHG | BODY MASS INDEX: 28.22 KG/M2 | HEIGHT: 59 IN | TEMPERATURE: 98 F | WEIGHT: 140 LBS | OXYGEN SATURATION: 98 % | HEART RATE: 91 BPM

## 2024-09-18 VITALS
HEIGHT: 59 IN | BODY MASS INDEX: 28.63 KG/M2 | SYSTOLIC BLOOD PRESSURE: 98 MMHG | DIASTOLIC BLOOD PRESSURE: 72 MMHG | WEIGHT: 142 LBS

## 2024-09-18 DIAGNOSIS — Z12.12 ENCOUNTER FOR SCREENING FOR MALIGNANT NEOPLASM OF COLON: ICD-10-CM

## 2024-09-18 DIAGNOSIS — Z00.00 ENCOUNTER FOR GENERAL ADULT MEDICAL EXAMINATION W/OUT ABNORMAL FINDINGS: ICD-10-CM

## 2024-09-18 DIAGNOSIS — Z80.0 FAMILY HISTORY OF MALIGNANT NEOPLASM OF DIGESTIVE ORGANS: ICD-10-CM

## 2024-09-18 DIAGNOSIS — Z97.5 PRESENCE OF (INTRAUTERINE) CONTRACEPTIVE DEVICE: ICD-10-CM

## 2024-09-18 DIAGNOSIS — R92.333 MAMMOGRAPHIC HETEROGENEOUS DENSITY, BILATERAL BREASTS: ICD-10-CM

## 2024-09-18 DIAGNOSIS — N89.8 OTHER SPECIFIED NONINFLAMMATORY DISORDERS OF VAGINA: ICD-10-CM

## 2024-09-18 DIAGNOSIS — Z12.4 ENCOUNTER FOR SCREENING FOR MALIGNANT NEOPLASM OF CERVIX: ICD-10-CM

## 2024-09-18 DIAGNOSIS — Z12.11 ENCOUNTER FOR SCREENING FOR MALIGNANT NEOPLASM OF COLON: ICD-10-CM

## 2024-09-18 DIAGNOSIS — I49.3 VENTRICULAR PREMATURE DEPOLARIZATION: ICD-10-CM

## 2024-09-18 DIAGNOSIS — F41.9 ANXIETY DISORDER, UNSPECIFIED: ICD-10-CM

## 2024-09-18 PROCEDURE — 99396 PREV VISIT EST AGE 40-64: CPT

## 2024-09-18 PROCEDURE — 36415 COLL VENOUS BLD VENIPUNCTURE: CPT

## 2024-09-18 PROCEDURE — 93000 ELECTROCARDIOGRAM COMPLETE: CPT | Mod: 59

## 2024-09-18 PROCEDURE — G0444 DEPRESSION SCREEN ANNUAL: CPT | Mod: 59

## 2024-09-18 PROCEDURE — 99212 OFFICE O/P EST SF 10 MIN: CPT | Mod: 25

## 2024-09-18 RX ORDER — MV-MIN/FOLIC/VIT K/LYCOP/COQ10 200-100MCG
CAPSULE ORAL
Refills: 0 | Status: ACTIVE | COMMUNITY

## 2024-09-18 RX ORDER — FLUOXETINE HYDROCHLORIDE 10 MG/1
10 CAPSULE ORAL
Qty: 90 | Refills: 1 | Status: ACTIVE | COMMUNITY
Start: 2024-09-18 | End: 1900-01-01

## 2024-09-18 NOTE — DISCUSSION/SUMMARY
[FreeTextEntry1] : 1) pap performed 2) discharge consistent with BV noted upon exam. will treat if noted on pap 3) reviewed 2022 mammo noting heterogenously dense breast. pt advised if insurance will cover to have b/l breast sono with mammo 4) discussed mood and metabolism changes that can occur with perimenopause. advsied Mediterranean diet and increased activity including weight bearing/weight lifting exercise 5) discussed trialing Prozac for increased anxiety. s/e reviewed. rx issued  return to office in 6 months for surveillance

## 2024-09-18 NOTE — HISTORY OF PRESENT ILLNESS
[Currently Active] : currently active [Men] : men [TextBox_4] : Petr is a 42 y/o  who presents today for an annual exam.  She reports mood changes and weight gain. She states that she was trialed on Lexapro but felt dazed for the entire week.  She has an appointment for a screening mammogram tomorrow. We reviewed findings of heterogenously dense breast tissue on  mammogram and advised if insurance will cover to have b/l breast sono as well.  Her mother is  from colon cancer, diagnosed late stage at stage   ,  at age 69. Her last colonoscopy was 3 years ago. [Mammogramdate] : 8/26/22 [FreeTextEntry1] : 9/4/25 [FreeTextEntry3] : Mirena IUD as of 7/11/22

## 2024-09-18 NOTE — PHYSICAL EXAM
[Appropriately responsive] : appropriately responsive [Examination Of The Breasts] : a normal appearance [No Discharge] : no discharge [No Masses] : no breast masses were palpable [Labia Majora] : normal [Labia Minora] : normal [Normal] : normal [Uterine Adnexae] : normal [FreeTextEntry4] : moderate thin white vaginal discharge

## 2024-09-18 NOTE — HISTORY OF PRESENT ILLNESS
-on heparin gtt for sub therapeutic INR of 1.7  -will bridge back to warfarin, after pulmonology recommendations regarding need for thorocentisis (see below)  -home warfarin dose was 7.5 mg daily   [de-identified] : Patient presents to office for annual wellness exam Overall she feels well and denies any exertional chest pain, shortness of breath, palpitations. She is presently on no medication. There is a family history in her mother of colon cancer and she states that she had a colonoscopy several years ago.

## 2024-09-18 NOTE — ASSESSMENT
[FreeTextEntry1] : Stable Advise regular aerobic exercise and heart healthy Check routine blood work at lab, stool Advised follow-up screening colonoscopy

## 2024-09-18 NOTE — HEALTH RISK ASSESSMENT
[Little interest or pleasure doing things] : 1) Little interest or pleasure doing things [Feeling down, depressed, or hopeless] : 2) Feeling down, depressed, or hopeless [0] : 2) Feeling down, depressed, or hopeless: Not at all (0) [PHQ-2 Negative - No further assessment needed] : PHQ-2 Negative - No further assessment needed [Patient reported mammogram was normal] : Patient reported mammogram was normal [Patient reported PAP Smear was normal] : Patient reported PAP Smear was normal [Patient reported colonoscopy was normal] : Patient reported colonoscopy was normal [[id number]] : 0 [MammogramDate] : 2023 [PapSmearDate] : 2023 [PapSmearComments] : Advised follow-up [ColonoscopyComments] : Several years ago, advised follow-up

## 2024-09-18 NOTE — PHYSICAL EXAM
[Normal Outer Ear/Nose] : the outer ears and nose were normal in appearance [Normal Oropharynx] : the oropharynx was normal [No Lymphadenopathy] : no lymphadenopathy [Thyroid Normal, No Nodules] : the thyroid was normal and there were no nodules present [No Carotid Bruits] : no carotid bruits [No Abdominal Bruit] : a ~M bruit was not heard ~T in the abdomen [No Varicosities] : no varicosities [No Edema] : there was no peripheral edema [No Palpable Aorta] : no palpable aorta [Normal Appearance] : normal in appearance [No Masses] : no palpable masses [No Nipple Discharge] : no nipple discharge [No Axillary Lymphadenopathy] : no axillary lymphadenopathy [Normal] : no posterior cervical lymphadenopathy and no anterior cervical lymphadenopathy [No CVA Tenderness] : no CVA  tenderness [No Spinal Tenderness] : no spinal tenderness [No Joint Swelling] : no joint swelling [No Rash] : no rash [Coordination Grossly Intact] : coordination grossly intact [No Focal Deficits] : no focal deficits [Normal Gait] : normal gait [Normal Affect] : the affect was normal [Normal Insight/Judgement] : insight and judgment were intact

## 2024-09-19 ENCOUNTER — RESULT REVIEW (OUTPATIENT)
Age: 44
End: 2024-09-19

## 2024-09-19 ENCOUNTER — APPOINTMENT (OUTPATIENT)
Dept: ULTRASOUND IMAGING | Facility: CLINIC | Age: 44
End: 2024-09-19
Payer: COMMERCIAL

## 2024-09-19 ENCOUNTER — APPOINTMENT (OUTPATIENT)
Dept: MAMMOGRAPHY | Facility: CLINIC | Age: 44
End: 2024-09-19
Payer: COMMERCIAL

## 2024-09-19 ENCOUNTER — OUTPATIENT (OUTPATIENT)
Dept: OUTPATIENT SERVICES | Facility: HOSPITAL | Age: 44
LOS: 1 days | End: 2024-09-19
Payer: COMMERCIAL

## 2024-09-19 ENCOUNTER — LABORATORY RESULT (OUTPATIENT)
Age: 44
End: 2024-09-19

## 2024-09-19 DIAGNOSIS — Z00.00 ENCOUNTER FOR GENERAL ADULT MEDICAL EXAMINATION WITHOUT ABNORMAL FINDINGS: ICD-10-CM

## 2024-09-19 DIAGNOSIS — Z98.82 BREAST IMPLANT STATUS: Chronic | ICD-10-CM

## 2024-09-19 LAB — HPV HIGH+LOW RISK DNA PNL CVX: NOT DETECTED

## 2024-09-19 PROCEDURE — 77067 SCR MAMMO BI INCL CAD: CPT | Mod: 26

## 2024-09-19 PROCEDURE — 77063 BREAST TOMOSYNTHESIS BI: CPT | Mod: 26

## 2024-09-19 PROCEDURE — 77067 SCR MAMMO BI INCL CAD: CPT

## 2024-09-19 PROCEDURE — 76641 ULTRASOUND BREAST COMPLETE: CPT

## 2024-09-19 PROCEDURE — 77063 BREAST TOMOSYNTHESIS BI: CPT

## 2024-09-19 PROCEDURE — 76641 ULTRASOUND BREAST COMPLETE: CPT | Mod: 26,50

## 2024-09-20 LAB
ALBUMIN SERPL ELPH-MCNC: 4.6 G/DL
ALP BLD-CCNC: 71 U/L
ALT SERPL-CCNC: 15 U/L
ANION GAP SERPL CALC-SCNC: 12 MMOL/L
APPEARANCE: CLEAR
AST SERPL-CCNC: 19 U/L
BACTERIA: NEGATIVE /HPF
BASOPHILS # BLD AUTO: 0.04 K/UL
BASOPHILS NFR BLD AUTO: 0.7 %
BILIRUB SERPL-MCNC: 0.3 MG/DL
BILIRUBIN URINE: NEGATIVE
BLOOD URINE: NEGATIVE
BUN SERPL-MCNC: 13 MG/DL
CALCIUM SERPL-MCNC: 9.6 MG/DL
CAST: 0 /LPF
CHLORIDE SERPL-SCNC: 104 MMOL/L
CHOLEST SERPL-MCNC: 184 MG/DL
CO2 SERPL-SCNC: 24 MMOL/L
COLOR: YELLOW
CREAT SERPL-MCNC: 0.81 MG/DL
EGFR: 91 ML/MIN/1.73M2
EOSINOPHIL # BLD AUTO: 0.37 K/UL
EOSINOPHIL NFR BLD AUTO: 6.5 %
EPITHELIAL CELLS: 4 /HPF
ESTIMATED AVERAGE GLUCOSE: 103 MG/DL
GLUCOSE QUALITATIVE U: NEGATIVE MG/DL
GLUCOSE SERPL-MCNC: 86 MG/DL
HBA1C MFR BLD HPLC: 5.2 %
HCT VFR BLD CALC: 41.4 %
HDLC SERPL-MCNC: 62 MG/DL
HGB BLD-MCNC: 13.3 G/DL
IMM GRANULOCYTES NFR BLD AUTO: 0 %
KETONES URINE: NEGATIVE MG/DL
LDLC SERPL CALC-MCNC: 101 MG/DL
LEUKOCYTE ESTERASE URINE: NEGATIVE
LYMPHOCYTES # BLD AUTO: 2.38 K/UL
LYMPHOCYTES NFR BLD AUTO: 41.8 %
MAN DIFF?: NORMAL
MCHC RBC-ENTMCNC: 30.6 PG
MCHC RBC-ENTMCNC: 32.1 GM/DL
MCV RBC AUTO: 95.4 FL
MICROSCOPIC-UA: NORMAL
MONOCYTES # BLD AUTO: 0.39 K/UL
MONOCYTES NFR BLD AUTO: 6.8 %
NEUTROPHILS # BLD AUTO: 2.52 K/UL
NEUTROPHILS NFR BLD AUTO: 44.2 %
NITRITE URINE: NEGATIVE
NONHDLC SERPL-MCNC: 122 MG/DL
PH URINE: 6.5
PLATELET # BLD AUTO: 289 K/UL
POTASSIUM SERPL-SCNC: 4.4 MMOL/L
PROT SERPL-MCNC: 7.5 G/DL
PROTEIN URINE: NEGATIVE MG/DL
RBC # BLD: 4.34 M/UL
RBC # FLD: 12.5 %
RED BLOOD CELLS URINE: 0 /HPF
SODIUM SERPL-SCNC: 141 MMOL/L
SPECIFIC GRAVITY URINE: 1.02
TRIGL SERPL-MCNC: 116 MG/DL
TSH SERPL-ACNC: 0.68 UIU/ML
UROBILINOGEN URINE: 0.2 MG/DL
WBC # FLD AUTO: 5.7 K/UL
WHITE BLOOD CELLS URINE: 1 /HPF

## 2024-09-23 DIAGNOSIS — N76.0 ACUTE VAGINITIS: ICD-10-CM

## 2024-09-23 DIAGNOSIS — B96.89 ACUTE VAGINITIS: ICD-10-CM

## 2024-09-23 LAB — CYTOLOGY CVX/VAG DOC THIN PREP: ABNORMAL

## 2024-09-23 RX ORDER — METRONIDAZOLE 7.5 MG/G
0.75 GEL VAGINAL
Qty: 1 | Refills: 0 | Status: ACTIVE | COMMUNITY
Start: 2024-09-23 | End: 1900-01-01

## 2024-10-01 DIAGNOSIS — Z00.00 ENCOUNTER FOR GENERAL ADULT MEDICAL EXAMINATION W/OUT ABNORMAL FINDINGS: ICD-10-CM

## 2025-03-24 ENCOUNTER — APPOINTMENT (OUTPATIENT)
Dept: OBGYN | Facility: CLINIC | Age: 45
End: 2025-03-24

## 2025-06-30 ENCOUNTER — APPOINTMENT (OUTPATIENT)
Dept: DERMATOLOGY | Facility: CLINIC | Age: 45
End: 2025-06-30

## (undated) DEVICE — SYR LUER LOK 50CC

## (undated) DEVICE — Device

## (undated) DEVICE — SUT CHROMIC 4-0 18" G-2

## (undated) DEVICE — SUT CHROMIC 4-0 54" REEL

## (undated) DEVICE — DRSG SPLINT INTRA NASAL .5MM OVERSIZE THICK

## (undated) DEVICE — CATH ANGIO 14G X 3.25"

## (undated) DEVICE — WARMING BLANKET LOWER ADULT

## (undated) DEVICE — GLV 7.5 PROTEXIS (WHITE)

## (undated) DEVICE — MARKING PEN W RULER

## (undated) DEVICE — SUT ETHILON 3-0 30" FS-1

## (undated) DEVICE — PACKING GAUZE PLAIN 0.5"

## (undated) DEVICE — PACK SMR

## (undated) DEVICE — CATH IV SAFE INSYTE 14G X 1.75" (ORANGE)

## (undated) DEVICE — SUT POLYSORB 3-0 30" V-20

## (undated) DEVICE — DRSG SPLINT NASAL THERMA MED

## (undated) DEVICE — SYR LUER LOK 5CC

## (undated) DEVICE — DRSG STERISTRIPS 0.25 X 3"

## (undated) DEVICE — DRAPE TOWEL BLUE 17" X 24"

## (undated) DEVICE — SUT PLAIN GUT 4-0 18" SC-1

## (undated) DEVICE — DRSG MEROCEL 2000 WITH STRING 8CM

## (undated) DEVICE — SUT VICRYL 4-0 18" P-3 UNDYED

## (undated) DEVICE — VISITEC 4X4

## (undated) DEVICE — ELCTR ENT BOVIE SUCTION 10FR 6"

## (undated) DEVICE — DRSG STERISTRIPS 0.5 X 4"

## (undated) DEVICE — LABELS BLANK W PEN

## (undated) DEVICE — VENODYNE/SCD SLEEVE CALF MEDIUM